# Patient Record
Sex: MALE | Race: WHITE | NOT HISPANIC OR LATINO | URBAN - METROPOLITAN AREA
[De-identification: names, ages, dates, MRNs, and addresses within clinical notes are randomized per-mention and may not be internally consistent; named-entity substitution may affect disease eponyms.]

---

## 2017-10-16 ENCOUNTER — ON CAMPUS - OUTPATIENT (AMBULATORY)
Dept: URBAN - METROPOLITAN AREA HOSPITAL 2 | Facility: HOSPITAL | Age: 67
End: 2017-10-16

## 2017-10-16 VITALS
SYSTOLIC BLOOD PRESSURE: 123 MMHG | HEART RATE: 58 BPM | DIASTOLIC BLOOD PRESSURE: 68 MMHG | DIASTOLIC BLOOD PRESSURE: 85 MMHG | SYSTOLIC BLOOD PRESSURE: 173 MMHG | DIASTOLIC BLOOD PRESSURE: 88 MMHG | HEIGHT: 72 IN | DIASTOLIC BLOOD PRESSURE: 91 MMHG | OXYGEN SATURATION: 96 % | HEART RATE: 71 BPM | SYSTOLIC BLOOD PRESSURE: 126 MMHG | HEART RATE: 60 BPM | OXYGEN SATURATION: 99 % | DIASTOLIC BLOOD PRESSURE: 72 MMHG | HEART RATE: 62 BPM | TEMPERATURE: 97.5 F | HEART RATE: 64 BPM | SYSTOLIC BLOOD PRESSURE: 113 MMHG | SYSTOLIC BLOOD PRESSURE: 182 MMHG | SYSTOLIC BLOOD PRESSURE: 125 MMHG | DIASTOLIC BLOOD PRESSURE: 62 MMHG | WEIGHT: 245 LBS | OXYGEN SATURATION: 98 % | DIASTOLIC BLOOD PRESSURE: 73 MMHG | SYSTOLIC BLOOD PRESSURE: 111 MMHG | OXYGEN SATURATION: 100 % | SYSTOLIC BLOOD PRESSURE: 165 MMHG | RESPIRATION RATE: 16 BRPM | HEART RATE: 65 BPM

## 2017-10-16 DIAGNOSIS — K57.30 DIVERTICULOSIS OF LARGE INTESTINE WITHOUT PERFORATION OR ABS: ICD-10-CM

## 2017-10-16 DIAGNOSIS — K64.1 SECOND DEGREE HEMORRHOIDS: ICD-10-CM

## 2017-10-16 DIAGNOSIS — K62.5 HEMORRHAGE OF ANUS AND RECTUM: ICD-10-CM

## 2017-10-16 PROCEDURE — 45378 DIAGNOSTIC COLONOSCOPY: CPT | Performed by: INTERNAL MEDICINE

## 2017-10-16 RX ORDER — HYDROCORTISONE 25 MG/G
CREAM TOPICAL
Qty: 30 | Refills: 6 | Status: COMPLETED
Start: 2017-10-16 | End: 2020-06-26

## 2017-10-16 RX ADMIN — PROPOFOL: 10 INJECTION, EMULSION INTRAVENOUS at 15:57

## 2018-11-06 ENCOUNTER — HOSPITAL ENCOUNTER (OUTPATIENT)
Dept: CARDIOLOGY | Facility: HOSPITAL | Age: 68
Discharge: HOME | End: 2018-11-06
Attending: FAMILY MEDICINE | Admitting: FAMILY MEDICINE

## 2019-04-09 ENCOUNTER — HOSPITAL ENCOUNTER (OUTPATIENT)
Dept: OTHER | Facility: HOSPITAL | Age: 69
Setting detail: SPECIMEN
Discharge: HOME | End: 2019-04-09
Attending: PODIATRIST | Admitting: PODIATRIST

## 2020-06-26 ENCOUNTER — OFFICE (AMBULATORY)
Dept: URBAN - METROPOLITAN AREA CLINIC 64 | Facility: CLINIC | Age: 70
End: 2020-06-26
Payer: COMMERCIAL

## 2020-06-26 VITALS
SYSTOLIC BLOOD PRESSURE: 140 MMHG | HEIGHT: 72 IN | DIASTOLIC BLOOD PRESSURE: 66 MMHG | HEART RATE: 66 BPM | WEIGHT: 252 LBS

## 2020-06-26 DIAGNOSIS — K64.8 OTHER HEMORRHOIDS: ICD-10-CM

## 2020-06-26 DIAGNOSIS — K62.5 HEMORRHAGE OF ANUS AND RECTUM: ICD-10-CM

## 2020-06-26 PROCEDURE — 99213 OFFICE O/P EST LOW 20 MIN: CPT | Performed by: NURSE PRACTITIONER

## 2020-07-10 ENCOUNTER — OFFICE (AMBULATORY)
Dept: URBAN - METROPOLITAN AREA CLINIC 64 | Facility: CLINIC | Age: 70
End: 2020-07-10
Payer: COMMERCIAL

## 2020-07-10 VITALS — HEIGHT: 72 IN

## 2020-07-10 DIAGNOSIS — K64.1 SECOND DEGREE HEMORRHOIDS: ICD-10-CM

## 2020-07-10 DIAGNOSIS — K62.5 HEMORRHAGE OF ANUS AND RECTUM: ICD-10-CM

## 2020-07-10 PROCEDURE — 46221 LIGATION OF HEMORRHOID(S): CPT | Performed by: INTERNAL MEDICINE

## 2020-07-24 ENCOUNTER — OFFICE (AMBULATORY)
Dept: URBAN - METROPOLITAN AREA CLINIC 64 | Facility: CLINIC | Age: 70
End: 2020-07-24
Payer: COMMERCIAL

## 2020-07-24 VITALS — HEIGHT: 72 IN

## 2020-07-24 DIAGNOSIS — K62.5 HEMORRHAGE OF ANUS AND RECTUM: ICD-10-CM

## 2020-07-24 DIAGNOSIS — K64.1 SECOND DEGREE HEMORRHOIDS: ICD-10-CM

## 2020-07-24 PROCEDURE — 46221 LIGATION OF HEMORRHOID(S): CPT | Performed by: INTERNAL MEDICINE

## 2020-08-07 ENCOUNTER — OFFICE (AMBULATORY)
Dept: URBAN - METROPOLITAN AREA CLINIC 64 | Facility: CLINIC | Age: 70
End: 2020-08-07
Payer: COMMERCIAL

## 2020-08-07 VITALS — HEIGHT: 72 IN

## 2020-08-07 DIAGNOSIS — K62.5 HEMORRHAGE OF ANUS AND RECTUM: ICD-10-CM

## 2020-08-07 DIAGNOSIS — K64.1 SECOND DEGREE HEMORRHOIDS: ICD-10-CM

## 2020-08-07 PROCEDURE — 46221 LIGATION OF HEMORRHOID(S): CPT | Performed by: INTERNAL MEDICINE

## 2022-11-01 ENCOUNTER — ON CAMPUS - OUTPATIENT (AMBULATORY)
Dept: URBAN - METROPOLITAN AREA HOSPITAL 2 | Facility: HOSPITAL | Age: 72
End: 2022-11-01

## 2022-11-01 ENCOUNTER — OFFICE (AMBULATORY)
Dept: URBAN - METROPOLITAN AREA PATHOLOGY 4 | Facility: PATHOLOGY | Age: 72
End: 2022-11-01
Payer: COMMERCIAL

## 2022-11-01 ENCOUNTER — OFFICE (AMBULATORY)
Dept: URBAN - METROPOLITAN AREA PATHOLOGY 4 | Facility: PATHOLOGY | Age: 72
End: 2022-11-01

## 2022-11-01 VITALS
DIASTOLIC BLOOD PRESSURE: 61 MMHG | RESPIRATION RATE: 15 BRPM | SYSTOLIC BLOOD PRESSURE: 100 MMHG | OXYGEN SATURATION: 96 % | DIASTOLIC BLOOD PRESSURE: 63 MMHG | HEART RATE: 56 BPM | HEIGHT: 72 IN | SYSTOLIC BLOOD PRESSURE: 102 MMHG | DIASTOLIC BLOOD PRESSURE: 71 MMHG | SYSTOLIC BLOOD PRESSURE: 95 MMHG | DIASTOLIC BLOOD PRESSURE: 55 MMHG | HEART RATE: 61 BPM | OXYGEN SATURATION: 97 % | HEART RATE: 60 BPM | SYSTOLIC BLOOD PRESSURE: 136 MMHG | SYSTOLIC BLOOD PRESSURE: 156 MMHG | HEART RATE: 63 BPM | SYSTOLIC BLOOD PRESSURE: 149 MMHG | HEART RATE: 58 BPM | DIASTOLIC BLOOD PRESSURE: 141 MMHG | HEART RATE: 57 BPM | DIASTOLIC BLOOD PRESSURE: 77 MMHG | TEMPERATURE: 98.7 F | DIASTOLIC BLOOD PRESSURE: 58 MMHG | DIASTOLIC BLOOD PRESSURE: 56 MMHG | DIASTOLIC BLOOD PRESSURE: 60 MMHG | SYSTOLIC BLOOD PRESSURE: 144 MMHG | OXYGEN SATURATION: 98 % | RESPIRATION RATE: 16 BRPM | SYSTOLIC BLOOD PRESSURE: 123 MMHG | WEIGHT: 242 LBS | OXYGEN SATURATION: 95 %

## 2022-11-01 DIAGNOSIS — K51.40 INFLAMMATORY POLYPS OF COLON WITHOUT COMPLICATIONS: ICD-10-CM

## 2022-11-01 DIAGNOSIS — K57.30 DIVERTICULOSIS OF LARGE INTESTINE WITHOUT PERFORATION OR ABS: ICD-10-CM

## 2022-11-01 DIAGNOSIS — Z86.010 PERSONAL HISTORY OF COLONIC POLYPS: ICD-10-CM

## 2022-11-01 DIAGNOSIS — K62.1 RECTAL POLYP: ICD-10-CM

## 2022-11-01 DIAGNOSIS — K63.5 POLYP OF COLON: ICD-10-CM

## 2022-11-01 LAB
GI HISTOLOGY: A. UNSPECIFIED: (no result)
GI HISTOLOGY: B. UNSPECIFIED: (no result)
GI HISTOLOGY: C. UNSPECIFIED: (no result)
GI HISTOLOGY: PDF REPORT: (no result)

## 2022-11-01 PROCEDURE — 45385 COLONOSCOPY W/LESION REMOVAL: CPT | Mod: PT | Performed by: INTERNAL MEDICINE

## 2022-11-01 PROCEDURE — 45380 COLONOSCOPY AND BIOPSY: CPT | Mod: PT,59 | Performed by: INTERNAL MEDICINE

## 2022-11-01 PROCEDURE — 88305 TISSUE EXAM BY PATHOLOGIST: CPT | Mod: 26 | Performed by: INTERNAL MEDICINE

## 2022-11-01 PROCEDURE — 45380 COLONOSCOPY AND BIOPSY: CPT | Mod: 59,PT | Performed by: INTERNAL MEDICINE

## 2022-11-07 ENCOUNTER — APPOINTMENT (OUTPATIENT)
Dept: CT IMAGING | Facility: HOSPITAL | Age: 72
End: 2022-11-07

## 2022-11-07 ENCOUNTER — HOSPITAL ENCOUNTER (OUTPATIENT)
Facility: HOSPITAL | Age: 72
Setting detail: OBSERVATION
Discharge: HOME | End: 2022-11-09
Attending: EMERGENCY MEDICINE | Admitting: INTERNAL MEDICINE

## 2022-11-07 ENCOUNTER — APPOINTMENT (OUTPATIENT)
Dept: GENERAL RADIOLOGY | Facility: HOSPITAL | Age: 72
End: 2022-11-07

## 2022-11-07 DIAGNOSIS — D72.829 LEUKOCYTOSIS, UNSPECIFIED TYPE: ICD-10-CM

## 2022-11-07 DIAGNOSIS — R94.39 ABNORMAL NUCLEAR STRESS TEST: ICD-10-CM

## 2022-11-07 DIAGNOSIS — R07.9 CHEST PAIN, UNSPECIFIED TYPE: Primary | ICD-10-CM

## 2022-11-07 DIAGNOSIS — A41.9 SEPSIS, DUE TO UNSPECIFIED ORGANISM, UNSPECIFIED WHETHER ACUTE ORGAN DYSFUNCTION PRESENT: ICD-10-CM

## 2022-11-07 LAB
ALBUMIN SERPL-MCNC: 4.8 G/DL (ref 3.5–5.2)
ALBUMIN/GLOB SERPL: 1.5 G/DL
ALP SERPL-CCNC: 90 U/L (ref 39–117)
ALT SERPL W P-5'-P-CCNC: 45 U/L (ref 1–41)
ANION GAP SERPL CALCULATED.3IONS-SCNC: 11 MMOL/L (ref 5–15)
APTT PPP: 25.4 SECONDS (ref 61–76.5)
AST SERPL-CCNC: 25 U/L (ref 1–40)
B PARAPERT DNA SPEC QL NAA+PROBE: NOT DETECTED
B PERT DNA SPEC QL NAA+PROBE: NOT DETECTED
BILIRUB SERPL-MCNC: 0.4 MG/DL (ref 0–1.2)
BILIRUB UR QL STRIP: NEGATIVE
BUN SERPL-MCNC: 18 MG/DL (ref 8–23)
BUN/CREAT SERPL: 18.6 (ref 7–25)
C PNEUM DNA NPH QL NAA+NON-PROBE: NOT DETECTED
CALCIUM SPEC-SCNC: 9.8 MG/DL (ref 8.6–10.5)
CHLORIDE SERPL-SCNC: 103 MMOL/L (ref 98–107)
CLARITY UR: CLEAR
CO2 SERPL-SCNC: 27 MMOL/L (ref 22–29)
COLOR UR: YELLOW
CREAT SERPL-MCNC: 0.97 MG/DL (ref 0.76–1.27)
D DIMER PPP FEU-MCNC: 3.33 MG/L (FEU) (ref 0–0.59)
D-LACTATE SERPL-SCNC: 1.6 MMOL/L (ref 0.5–2)
DEPRECATED RDW RBC AUTO: 47.7 FL (ref 37–54)
EGFRCR SERPLBLD CKD-EPI 2021: 84.5 ML/MIN/1.73
ERYTHROCYTE [DISTWIDTH] IN BLOOD BY AUTOMATED COUNT: 14.7 % (ref 12.3–15.4)
FLUAV SUBTYP SPEC NAA+PROBE: NOT DETECTED
FLUBV RNA ISLT QL NAA+PROBE: NOT DETECTED
GLOBULIN UR ELPH-MCNC: 3.1 GM/DL
GLUCOSE SERPL-MCNC: 125 MG/DL (ref 65–99)
GLUCOSE UR STRIP-MCNC: NEGATIVE MG/DL
HADV DNA SPEC NAA+PROBE: NOT DETECTED
HCOV 229E RNA SPEC QL NAA+PROBE: NOT DETECTED
HCOV HKU1 RNA SPEC QL NAA+PROBE: NOT DETECTED
HCOV NL63 RNA SPEC QL NAA+PROBE: NOT DETECTED
HCOV OC43 RNA SPEC QL NAA+PROBE: NOT DETECTED
HCT VFR BLD AUTO: 45.2 % (ref 37.5–51)
HGB BLD-MCNC: 14.8 G/DL (ref 13–17.7)
HGB UR QL STRIP.AUTO: NEGATIVE
HMPV RNA NPH QL NAA+NON-PROBE: NOT DETECTED
HOLD SPECIMEN: NORMAL
HPIV1 RNA ISLT QL NAA+PROBE: NOT DETECTED
HPIV2 RNA SPEC QL NAA+PROBE: NOT DETECTED
HPIV3 RNA NPH QL NAA+PROBE: NOT DETECTED
HPIV4 P GENE NPH QL NAA+PROBE: NOT DETECTED
INR PPP: 0.94 (ref 0.93–1.1)
KETONES UR QL STRIP: NEGATIVE
LEUKOCYTE ESTERASE UR QL STRIP.AUTO: NEGATIVE
LIPASE SERPL-CCNC: 38 U/L (ref 13–60)
LYMPHOCYTES # BLD MANUAL: 0.89 10*3/MM3 (ref 0.7–3.1)
LYMPHOCYTES NFR BLD MANUAL: 4 % (ref 5–12)
M PNEUMO IGG SER IA-ACNC: NOT DETECTED
MCH RBC QN AUTO: 30.6 PG (ref 26.6–33)
MCHC RBC AUTO-ENTMCNC: 32.7 G/DL (ref 31.5–35.7)
MCV RBC AUTO: 93.4 FL (ref 79–97)
METAMYELOCYTES NFR BLD MANUAL: 7 % (ref 0–0)
MONOCYTES # BLD: 0.71 10*3/MM3 (ref 0.1–0.9)
NEUTROPHILS # BLD AUTO: 14.87 10*3/MM3 (ref 1.7–7)
NEUTROPHILS NFR BLD MANUAL: 71 % (ref 42.7–76)
NEUTS BAND NFR BLD MANUAL: 13 % (ref 0–5)
NEUTS VAC BLD QL SMEAR: ABNORMAL
NITRITE UR QL STRIP: NEGATIVE
PH UR STRIP.AUTO: <=5 [PH] (ref 5–8)
PLAT MORPH BLD: NORMAL
PLATELET # BLD AUTO: 265 10*3/MM3 (ref 140–450)
PMV BLD AUTO: 7.9 FL (ref 6–12)
POTASSIUM SERPL-SCNC: 4.5 MMOL/L (ref 3.5–5.2)
PROT SERPL-MCNC: 7.9 G/DL (ref 6–8.5)
PROT UR QL STRIP: NEGATIVE
PROTHROMBIN TIME: 9.7 SECONDS (ref 9.6–11.7)
RBC # BLD AUTO: 4.84 10*6/MM3 (ref 4.14–5.8)
RBC MORPH BLD: NORMAL
RHINOVIRUS RNA SPEC NAA+PROBE: NOT DETECTED
RSV RNA NPH QL NAA+NON-PROBE: NOT DETECTED
SARS-COV-2 RNA NPH QL NAA+NON-PROBE: NOT DETECTED
SCAN SLIDE: NORMAL
SODIUM SERPL-SCNC: 141 MMOL/L (ref 136–145)
SP GR UR STRIP: 1.03 (ref 1–1.03)
TROPONIN T SERPL-MCNC: <0.01 NG/ML (ref 0–0.03)
UROBILINOGEN UR QL STRIP: NORMAL
VARIANT LYMPHS NFR BLD MANUAL: 5 % (ref 19.6–45.3)
WBC NRBC COR # BLD: 17.7 10*3/MM3 (ref 3.4–10.8)

## 2022-11-07 PROCEDURE — 85730 THROMBOPLASTIN TIME PARTIAL: CPT | Performed by: EMERGENCY MEDICINE

## 2022-11-07 PROCEDURE — 87040 BLOOD CULTURE FOR BACTERIA: CPT | Performed by: EMERGENCY MEDICINE

## 2022-11-07 PROCEDURE — G0378 HOSPITAL OBSERVATION PER HR: HCPCS

## 2022-11-07 PROCEDURE — 96372 THER/PROPH/DIAG INJ SC/IM: CPT

## 2022-11-07 PROCEDURE — 85610 PROTHROMBIN TIME: CPT | Performed by: EMERGENCY MEDICINE

## 2022-11-07 PROCEDURE — 81003 URINALYSIS AUTO W/O SCOPE: CPT | Performed by: EMERGENCY MEDICINE

## 2022-11-07 PROCEDURE — 83605 ASSAY OF LACTIC ACID: CPT

## 2022-11-07 PROCEDURE — 25010000002 ENOXAPARIN PER 10 MG: Performed by: HOSPITALIST

## 2022-11-07 PROCEDURE — 99285 EMERGENCY DEPT VISIT HI MDM: CPT

## 2022-11-07 PROCEDURE — 25010000002 CEFEPIME PER 500 MG: Performed by: EMERGENCY MEDICINE

## 2022-11-07 PROCEDURE — 0 IOPAMIDOL PER 1 ML: Performed by: EMERGENCY MEDICINE

## 2022-11-07 PROCEDURE — 85379 FIBRIN DEGRADATION QUANT: CPT | Performed by: EMERGENCY MEDICINE

## 2022-11-07 PROCEDURE — 0202U NFCT DS 22 TRGT SARS-COV-2: CPT | Performed by: EMERGENCY MEDICINE

## 2022-11-07 PROCEDURE — 85007 BL SMEAR W/DIFF WBC COUNT: CPT | Performed by: EMERGENCY MEDICINE

## 2022-11-07 PROCEDURE — 85025 COMPLETE CBC W/AUTO DIFF WBC: CPT | Performed by: EMERGENCY MEDICINE

## 2022-11-07 PROCEDURE — 96365 THER/PROPH/DIAG IV INF INIT: CPT

## 2022-11-07 PROCEDURE — 71045 X-RAY EXAM CHEST 1 VIEW: CPT

## 2022-11-07 PROCEDURE — 84484 ASSAY OF TROPONIN QUANT: CPT | Performed by: EMERGENCY MEDICINE

## 2022-11-07 PROCEDURE — 83690 ASSAY OF LIPASE: CPT | Performed by: EMERGENCY MEDICINE

## 2022-11-07 PROCEDURE — 93005 ELECTROCARDIOGRAM TRACING: CPT | Performed by: EMERGENCY MEDICINE

## 2022-11-07 PROCEDURE — 71275 CT ANGIOGRAPHY CHEST: CPT

## 2022-11-07 PROCEDURE — 80053 COMPREHEN METABOLIC PANEL: CPT | Performed by: EMERGENCY MEDICINE

## 2022-11-07 RX ORDER — ONDANSETRON 2 MG/ML
4 INJECTION INTRAMUSCULAR; INTRAVENOUS EVERY 6 HOURS PRN
Status: DISCONTINUED | OUTPATIENT
Start: 2022-11-07 | End: 2022-11-09 | Stop reason: HOSPADM

## 2022-11-07 RX ORDER — ALLOPURINOL 300 MG/1
300 TABLET ORAL DAILY
Status: DISCONTINUED | OUTPATIENT
Start: 2022-11-08 | End: 2022-11-09 | Stop reason: HOSPADM

## 2022-11-07 RX ORDER — SODIUM CHLORIDE 0.9 % (FLUSH) 0.9 %
10 SYRINGE (ML) INJECTION AS NEEDED
Status: DISCONTINUED | OUTPATIENT
Start: 2022-11-07 | End: 2022-11-09 | Stop reason: HOSPADM

## 2022-11-07 RX ORDER — ASPIRIN 81 MG/1
81 TABLET ORAL DAILY
Status: DISCONTINUED | OUTPATIENT
Start: 2022-11-08 | End: 2022-11-09 | Stop reason: HOSPADM

## 2022-11-07 RX ORDER — CEFDINIR 300 MG/1
300 CAPSULE ORAL EVERY 12 HOURS SCHEDULED
Status: DISCONTINUED | OUTPATIENT
Start: 2022-11-07 | End: 2022-11-09 | Stop reason: HOSPADM

## 2022-11-07 RX ORDER — HYDROCODONE BITARTRATE AND ACETAMINOPHEN 5; 325 MG/1; MG/1
1 TABLET ORAL EVERY 4 HOURS PRN
Status: DISCONTINUED | OUTPATIENT
Start: 2022-11-07 | End: 2022-11-09 | Stop reason: HOSPADM

## 2022-11-07 RX ORDER — LOSARTAN POTASSIUM 50 MG/1
100 TABLET ORAL
Status: DISCONTINUED | OUTPATIENT
Start: 2022-11-08 | End: 2022-11-09 | Stop reason: HOSPADM

## 2022-11-07 RX ORDER — METRONIDAZOLE 500 MG/1
500 TABLET ORAL EVERY 8 HOURS SCHEDULED
Status: DISCONTINUED | OUTPATIENT
Start: 2022-11-07 | End: 2022-11-09 | Stop reason: HOSPADM

## 2022-11-07 RX ORDER — AMOXICILLIN 250 MG
2 CAPSULE ORAL 2 TIMES DAILY PRN
Status: DISCONTINUED | OUTPATIENT
Start: 2022-11-07 | End: 2022-11-09 | Stop reason: HOSPADM

## 2022-11-07 RX ORDER — NEBIVOLOL 10 MG/1
1 TABLET ORAL DAILY
COMMUNITY
Start: 2022-05-27

## 2022-11-07 RX ORDER — ACETAMINOPHEN 325 MG/1
650 TABLET ORAL EVERY 4 HOURS PRN
Status: DISCONTINUED | OUTPATIENT
Start: 2022-11-07 | End: 2022-11-09 | Stop reason: HOSPADM

## 2022-11-07 RX ORDER — ALLOPURINOL 300 MG/1
1 TABLET ORAL DAILY
COMMUNITY
Start: 2022-09-26

## 2022-11-07 RX ORDER — CHOLECALCIFEROL (VITAMIN D3) 125 MCG
5 CAPSULE ORAL NIGHTLY PRN
Status: DISCONTINUED | OUTPATIENT
Start: 2022-11-07 | End: 2022-11-09 | Stop reason: HOSPADM

## 2022-11-07 RX ORDER — CALCIUM CARBONATE 300MG(750)
400 TABLET,CHEWABLE ORAL DAILY
COMMUNITY
End: 2022-11-09 | Stop reason: HOSPADM

## 2022-11-07 RX ORDER — COLCHICINE 0.6 MG/1
0.6 CAPSULE ORAL EVERY OTHER DAY
COMMUNITY
End: 2022-11-09 | Stop reason: HOSPADM

## 2022-11-07 RX ORDER — ONDANSETRON 4 MG/1
4 TABLET, FILM COATED ORAL EVERY 6 HOURS PRN
Status: DISCONTINUED | OUTPATIENT
Start: 2022-11-07 | End: 2022-11-09 | Stop reason: HOSPADM

## 2022-11-07 RX ORDER — ATORVASTATIN CALCIUM 10 MG/1
10 TABLET, FILM COATED ORAL DAILY
Status: DISCONTINUED | OUTPATIENT
Start: 2022-11-08 | End: 2022-11-09 | Stop reason: HOSPADM

## 2022-11-07 RX ORDER — CHOLECALCIFEROL (VITAMIN D3) 125 MCG
500 CAPSULE ORAL DAILY
COMMUNITY
End: 2022-11-09 | Stop reason: HOSPADM

## 2022-11-07 RX ORDER — BISACODYL 5 MG/1
5 TABLET, DELAYED RELEASE ORAL DAILY PRN
Status: DISCONTINUED | OUTPATIENT
Start: 2022-11-07 | End: 2022-11-09 | Stop reason: HOSPADM

## 2022-11-07 RX ORDER — ENOXAPARIN SODIUM 100 MG/ML
40 INJECTION SUBCUTANEOUS DAILY
Status: DISCONTINUED | OUTPATIENT
Start: 2022-11-07 | End: 2022-11-09 | Stop reason: HOSPADM

## 2022-11-07 RX ORDER — SIMVASTATIN 20 MG
20 TABLET ORAL NIGHTLY
COMMUNITY
Start: 2022-09-06

## 2022-11-07 RX ORDER — BISACODYL 10 MG
10 SUPPOSITORY, RECTAL RECTAL DAILY PRN
Status: DISCONTINUED | OUTPATIENT
Start: 2022-11-07 | End: 2022-11-09 | Stop reason: HOSPADM

## 2022-11-07 RX ORDER — VITAMIN B COMPLEX
1 CAPSULE ORAL DAILY
COMMUNITY
End: 2022-11-09 | Stop reason: HOSPADM

## 2022-11-07 RX ORDER — SODIUM CHLORIDE 0.9 % (FLUSH) 0.9 %
10 SYRINGE (ML) INJECTION EVERY 12 HOURS SCHEDULED
Status: DISCONTINUED | OUTPATIENT
Start: 2022-11-07 | End: 2022-11-09 | Stop reason: HOSPADM

## 2022-11-07 RX ORDER — NEBIVOLOL 10 MG/1
10 TABLET ORAL DAILY
Status: DISCONTINUED | OUTPATIENT
Start: 2022-11-08 | End: 2022-11-09 | Stop reason: HOSPADM

## 2022-11-07 RX ORDER — POLYETHYLENE GLYCOL 3350 17 G/17G
17 POWDER, FOR SOLUTION ORAL DAILY PRN
Status: DISCONTINUED | OUTPATIENT
Start: 2022-11-07 | End: 2022-11-09 | Stop reason: HOSPADM

## 2022-11-07 RX ORDER — LEVOTHYROXINE SODIUM 0.15 MG/1
1 TABLET ORAL
COMMUNITY
Start: 2022-10-10

## 2022-11-07 RX ORDER — FUROSEMIDE 20 MG/1
20 TABLET ORAL DAILY PRN
COMMUNITY
End: 2022-11-09 | Stop reason: HOSPADM

## 2022-11-07 RX ORDER — IRBESARTAN 300 MG/1
1 TABLET ORAL DAILY
COMMUNITY
Start: 2022-06-30

## 2022-11-07 RX ORDER — AMOXICILLIN 500 MG
1 CAPSULE ORAL DAILY
COMMUNITY
End: 2022-11-09 | Stop reason: HOSPADM

## 2022-11-07 RX ORDER — LEVOTHYROXINE SODIUM 0.15 MG/1
150 TABLET ORAL
Status: DISCONTINUED | OUTPATIENT
Start: 2022-11-08 | End: 2022-11-09 | Stop reason: HOSPADM

## 2022-11-07 RX ORDER — AMPICILLIN TRIHYDRATE 250 MG
1000 CAPSULE ORAL DAILY
COMMUNITY
End: 2022-11-09 | Stop reason: HOSPADM

## 2022-11-07 RX ORDER — PANTOPRAZOLE SODIUM 40 MG/1
40 TABLET, DELAYED RELEASE ORAL
Status: DISCONTINUED | OUTPATIENT
Start: 2022-11-08 | End: 2022-11-09 | Stop reason: HOSPADM

## 2022-11-07 RX ADMIN — CEFDINIR 300 MG: 300 CAPSULE ORAL at 20:27

## 2022-11-07 RX ADMIN — IOPAMIDOL 100 ML: 755 INJECTION, SOLUTION INTRAVENOUS at 14:44

## 2022-11-07 RX ADMIN — METRONIDAZOLE 500 MG: 500 TABLET ORAL at 21:46

## 2022-11-07 RX ADMIN — CEFEPIME 2 G: 2 INJECTION, POWDER, FOR SOLUTION INTRAVENOUS at 15:38

## 2022-11-07 RX ADMIN — ENOXAPARIN SODIUM 40 MG: 100 INJECTION SUBCUTANEOUS at 20:27

## 2022-11-07 NOTE — H&P
[facility] Medicine Services  History & Physical    Patient Name: Gray Huerta  : 1950  MRN: [id number]  Primary Care Physician:  Venecia Kohler MD  Date of admission: 2022  Date and Time of Service:  at     Subjective      Chief Complaint: Chest pain. Abdominal pain     History of Present Illness: Gray Huerta is a 71 y.o. male who presented to [facility] on 2022 complaining of Patient is a 71-year-old gentleman with history of gout, hypothyroidism, dyslipidemia, hypertension, PREMA on BiPAP remote history of smoking who presented to the emergency room with chief complaint of abdominal pain and chest pain.  Patient said on Tuesday he developed some upper abdominal pain when he woke up and went to drink coffee and had to go to the bathroom and had a watery bowel movement.  That had improved but he developed some left-sided chest pressure without any radiation.  Happened at rest last would last about 45 minutes and go away on its own.  No associated shortness of breath lightheadedness or diaphoresis.  Patient usually not able to ambulate much due to bilateral knee problems.  Due to the ongoing abdominal pain and chest pain he came in for further evaluation.  Of note he said he had a colonoscopy few days ago and had 4 polyps removed and reports he had mild diverticulitis and was not given any medications as it was what appears to be mild.    Patient denies any fevers, chills, sweats, sore throat.  He does have a runny nose which is chronic for him.  Denies any dysuria or hematuria.  No cough or shortness of breath.    In the emergency room patient's blood pressure is 130/92 with a heart rate of 71 and a temperature of 98.7.  Labs showed a leukocytosis of 17,000 with 13% bands.  D-dimer was elevated and CTA was done which was negative for PE but showed noncalcified nodules which are is chronic and being followed up as an outpatient.  Troponin was negative.  Of note  patient has calcified atherosclerosis of the coronary arteries on previous CTs.  Patient was given IV cefepime in the emergency room blood cultures were drawn will be admitted for observation.  Currently he has no chest pain      Review of Systems   Constitutional: Negative.   HENT: Negative.    Eyes: Negative.    Cardiovascular: Positive for chest pain. Negative for cyanosis, dyspnea on exertion, irregular heartbeat, leg swelling, near-syncope, orthopnea, palpitations, paroxysmal nocturnal dyspnea and syncope.   Respiratory: Negative.    Endocrine: Negative.    Hematologic/Lymphatic: Negative.    Skin: Negative.    Musculoskeletal: Negative.    Gastrointestinal: Positive for abdominal pain and diarrhea. Negative for dysphagia, excessive appetite, heartburn, hematemesis, hematochezia, hemorrhoids, jaundice, melena, nausea and vomiting.   Genitourinary: Negative.    Neurological: Negative.    Psychiatric/Behavioral: Negative.         Personal History     No past medical history on file.    No past surgical history on file.    Family History: family history is not on file. Otherwise pertinent FHx was reviewed and not pertinent to current issue.    Social History:      Home Medications:  Prior to Admission Medications     Prescriptions Last Dose Informant Patient Reported? Taking?    allopurinol (ZYLOPRIM) 300 MG tablet 11/6/2022  Yes Yes    Take 1 tablet by mouth Daily.    ascorbic acid (VITAMIN C) 1000 MG tablet 11/6/2022  Yes Yes    Take 1 tablet by mouth Daily.    B Complex Vitamins (vitamin b complex) capsule capsule 11/6/2022  Yes Yes    Take 1 capsule by mouth Daily.    Cinnamon 500 MG capsule 11/6/2022  Yes Yes    Take 2 capsules by mouth Daily.    colchicine 0.6 MG capsule capsule Past Week  Yes Yes    Take 1 capsule by mouth Every Other Day.    furosemide (LASIX) 20 MG tablet Past Week  Yes Yes    Take 1 tablet by mouth Daily As Needed (right leg swelling).    irbesartan (AVAPRO) 300 MG tablet 11/6/2022  Yes  Yes    Take 1 tablet by mouth Daily.    levothyroxine (SYNTHROID, LEVOTHROID) 150 MCG tablet 11/7/2022  Yes Yes    Take 1 tablet by mouth Every Morning.    Magnesium 400 MG tablet 11/6/2022  Yes Yes    Take 400 mg by mouth Daily.    nebivolol (BYSTOLIC) 10 MG tablet   Yes Yes    Take 1 tablet by mouth Daily.    Omega-3 Fatty Acids (fish oil) 1200 MG capsule capsule 11/6/2022  Yes Yes    Take 1 capsule by mouth Daily.    Probiotic Product (PROBIOTIC PO) 11/6/2022  Yes Yes    Take 1 capsule by mouth Daily.    QUERCETIN PO 11/6/2022  Yes Yes    Take 1 capsule by mouth Daily.    simvastatin (ZOCOR) 20 MG tablet 11/6/2022  Yes Yes    Take 1 tablet by mouth Every Night.    Turmeric Curcumin 500 MG capsule 11/6/2022  Yes Yes    Take 1 capsule by mouth Daily.    vitamin B-12 (CYANOCOBALAMIN) 500 MCG tablet 11/6/2022  Yes Yes    Take 1 tablet by mouth Daily.    vitamin D3 125 MCG (5000 UT) capsule capsule 11/6/2022  Yes Yes    Take 1 capsule by mouth Daily.    Zinc 50 MG capsule 11/6/2022  Yes Yes    Take 50 mg by mouth Daily.            Allergies:  No Known Allergies    Objective      Vitals:   Temp:  [98.7 °F (37.1 °C)] 98.7 °F (37.1 °C)  Heart Rate:  [] 104  Resp:  [18] 18  BP: (130-168)/(61-92) 149/61    Physical Exam   General: No acute distress  HEENT: neck supple, normal oral mucosa, no masses, no lymphadenopathy  Lungs: Clear bilaterally, no wheezing, No crackles, No Rhonchi. Equal excursions.   CV - Normal S1/S2, + systolic murmur LUSB,  Regular rate and rhythm   Abdomin - Soft, mild tenderness RLQ to deep palpation, non-distended, normal bowel sounds  Extremities - no edema, no erythema  Neuro - No focal weakness, normal sensation  Psych - Alert and oriented x3  Skin - no wounds or lesions.       Result Review    Result Review:  I have personally reviewed the results from the time of this admission to 11/7/2022 18:42 EST and agree with these findings:  [x]  Laboratory  [x]  Microbiology  [x]  Radiology  [x]   EKG/Telemetry   [x]  Cardiology/Vascular   []  Pathology  [x]  Old records  []  Other:  Most notable findings include:       Assessment & Plan        Active Hospital Problems:  There are no active hospital problems to display for this patient.    Plan:     Atypical chest pressure  - previous CT chest with calcified coronaries  - EKG with q waves II, III, avF. No acute ischemic changes  - Murmur on exam  - Check Echo. Consult Cardiology. Might benefit from Stress test - defer to cardiology  - NPO after midnight.     Abdominal pain/diarrhea  - Colonoscopy recently. Per patient was told has mild Diverticulitis  - Bandemia on labs  - Will give short course of Cefdinir and Flagyl for Diverticulitis   - Follow blood cultures    Gout - Allopurinol  Hypothyroidism - Levothyroxine  DL - Statin  HTN - Nebivolol, Irbesartan,   Remote history of smoking    DVT prophylaxis:  No DVT prophylaxis order currently exists.    CODE STATUS:       Admission Status:  I believe this patient meets Obs status.    I discussed the patient's findings and my recommendations with patient.    This patient has been  and discussed with . 11/07/22      Signature: Electronically signed by Muriel Bettencourt MD, 11/07/22, 18:42 EST.  [facility] Hospitalist Team

## 2022-11-07 NOTE — ED PROVIDER NOTES
"Subjective   History of Present Illness  Chief complaint: Chest pain    71-year-old male presents with chest pain.  Patient states the pain started this morning.  Pain is in the left chest without radiation.  He denies any shortness of breath.  He does report some nausea and mild headache.  He states he had a couple loose bowel movements this morning.  He denies any fever.  No known sick contacts.  He denies any alleviating or exacerbating factors.    History provided by:  Patient      Review of Systems   Constitutional: Negative for fever.   HENT: Negative for congestion.    Eyes: Negative for redness.   Respiratory: Negative for cough and shortness of breath.    Cardiovascular: Positive for chest pain.   Gastrointestinal: Positive for diarrhea and nausea. Negative for abdominal pain and vomiting.   Genitourinary: Negative for dysuria.   Musculoskeletal: Negative for back pain.   Skin: Negative for rash.   Neurological: Positive for headaches. Negative for dizziness.   Psychiatric/Behavioral: Negative for confusion.       No past medical history on file.    No Known Allergies    No past surgical history on file.    No family history on file.    Social History     Socioeconomic History   • Marital status:        /61   Pulse 104   Temp 98.7 °F (37.1 °C) (Oral)   Resp 18   Ht 188 cm (74\")   Wt 111 kg (245 lb)   SpO2 98%   BMI 31.46 kg/m²       Objective   Physical Exam  Vitals and nursing note reviewed.   Constitutional:       Appearance: He is well-developed.   HENT:      Head: Normocephalic and atraumatic.   Eyes:      Pupils: Pupils are equal, round, and reactive to light.   Cardiovascular:      Rate and Rhythm: Normal rate and regular rhythm.      Heart sounds: Normal heart sounds.   Pulmonary:      Effort: Pulmonary effort is normal. No respiratory distress.      Breath sounds: Normal breath sounds.   Abdominal:      General: Abdomen is flat. Bowel sounds are normal.      Palpations: Abdomen " is soft.      Tenderness: There is no abdominal tenderness.   Skin:     General: Skin is warm and dry.   Neurological:      General: No focal deficit present.      Mental Status: He is alert and oriented to person, place, and time.         Procedures           ED Course      My interpretation of EKG shows sinus rhythm, rate of 72, no ST elevation     Results for orders placed or performed during the hospital encounter of 11/07/22   Comprehensive Metabolic Panel    Specimen: Blood   Result Value Ref Range    Glucose 125 (H) 65 - 99 mg/dL    BUN 18 8 - 23 mg/dL    Creatinine 0.97 0.76 - 1.27 mg/dL    Sodium 141 136 - 145 mmol/L    Potassium 4.5 3.5 - 5.2 mmol/L    Chloride 103 98 - 107 mmol/L    CO2 27.0 22.0 - 29.0 mmol/L    Calcium 9.8 8.6 - 10.5 mg/dL    Total Protein 7.9 6.0 - 8.5 g/dL    Albumin 4.80 3.50 - 5.20 g/dL    ALT (SGPT) 45 (H) 1 - 41 U/L    AST (SGOT) 25 1 - 40 U/L    Alkaline Phosphatase 90 39 - 117 U/L    Total Bilirubin 0.4 0.0 - 1.2 mg/dL    Globulin 3.1 gm/dL    A/G Ratio 1.5 g/dL    BUN/Creatinine Ratio 18.6 7.0 - 25.0    Anion Gap 11.0 5.0 - 15.0 mmol/L    eGFR 84.5 >60.0 mL/min/1.73   Protime-INR    Specimen: Blood   Result Value Ref Range    Protime 9.7 9.6 - 11.7 Seconds    INR 0.94 0.93 - 1.10   aPTT    Specimen: Blood   Result Value Ref Range    PTT 25.4 (L) 61.0 - 76.5 seconds   Lipase    Specimen: Blood   Result Value Ref Range    Lipase 38 13 - 60 U/L   Troponin    Specimen: Blood   Result Value Ref Range    Troponin T <0.010 0.000 - 0.030 ng/mL   CBC Auto Differential    Specimen: Blood   Result Value Ref Range    WBC 17.70 (H) 3.40 - 10.80 10*3/mm3    RBC 4.84 4.14 - 5.80 10*6/mm3    Hemoglobin 14.8 13.0 - 17.7 g/dL    Hematocrit 45.2 37.5 - 51.0 %    MCV 93.4 79.0 - 97.0 fL    MCH 30.6 26.6 - 33.0 pg    MCHC 32.7 31.5 - 35.7 g/dL    RDW 14.7 12.3 - 15.4 %    RDW-SD 47.7 37.0 - 54.0 fl    MPV 7.9 6.0 - 12.0 fL    Platelets 265 140 - 450 10*3/mm3   Scan Slide    Specimen: Blood   Result  Value Ref Range    Scan Slide     Manual Differential    Specimen: Blood   Result Value Ref Range    Neutrophil % 71.0 42.7 - 76.0 %    Lymphocyte % 5.0 (L) 19.6 - 45.3 %    Monocyte % 4.0 (L) 5.0 - 12.0 %    Bands %  13.0 (H) 0.0 - 5.0 %    Metamyelocyte % 7.0 (H) 0.0 - 0.0 %    Neutrophils Absolute 14.87 (H) 1.70 - 7.00 10*3/mm3    Lymphocytes Absolute 0.89 0.70 - 3.10 10*3/mm3    Monocytes Absolute 0.71 0.10 - 0.90 10*3/mm3    RBC Morphology Normal Normal    Vacuolated Neutrophils Slight/1+ None Seen    Platelet Morphology Normal Normal   D-dimer, Quantitative    Specimen: Blood   Result Value Ref Range    D-Dimer, Quantitative 3.33 (H) 0.00 - 0.59 mg/L (FEU)   Urinalysis With Culture If Indicated - Urine, Clean Catch    Specimen: Urine, Clean Catch   Result Value Ref Range    Color, UA Yellow Yellow, Straw    Appearance, UA Clear Clear    pH, UA <=5.0 5.0 - 8.0    Specific Gravity, UA 1.028 1.005 - 1.030    Glucose, UA Negative Negative    Ketones, UA Negative Negative    Bilirubin, UA Negative Negative    Blood, UA Negative Negative    Protein, UA Negative Negative    Leuk Esterase, UA Negative Negative    Nitrite, UA Negative Negative    Urobilinogen, UA 0.2 E.U./dL 0.2 - 1.0 E.U./dL   POC Lactate    Specimen: Blood   Result Value Ref Range    Lactate 1.6 0.5 - 2.0 mmol/L   ECG 12 Lead Chest Pain   Result Value Ref Range    QT Interval 399 ms   Gold Top - SST   Result Value Ref Range    Extra Tube hide      XR Chest 1 View    Result Date: 11/7/2022  Questionable 1.5 cm noncalcified nodular density in the periphery of the right midlung, new since 2015. Dedicated CT chest is recommended for further evaluation at this time.  Electronically Signed By-Nory Galvan MD On:11/7/2022 11:27 AM This report was finalized on [id number] by  Nory Galvan MD.    CT Angiogram Chest Pulmonary Embolism    Result Date: 11/7/2022   1. No pulmonary embolism. 2. The questioned right lung nodule on previous chest x-ray  corresponds to a benign centrally calcified nodule, consistent with old granulomatous disease. 3. There are additional smaller noncalcified nodules in both lungs measuring 6 mm or less in size. Following the Fleischner Society criteria for pulmonary nodule management, CT chest follow-up in 3-6 months is recommended. 4. Hepatic steatosis.    Electronically Signed By-Nory Galvan MD On:11/7/2022 2:54 PM This report was finalized on [id number] by  Nory Galvan MD.                                    MDM   Patient had the above evaluation.  Results were discussed with the patient.  EKG shows no acute ischemia.  Troponin is negative.  D-dimer was elevated so CT PE protocol was obtained.  This showed no PE.  White blood cell count is significantly elevated at 17.7 with 13% bands.  Sepsis protocol was initiated.  Lactic acid was normal.  Blood cultures were obtained.  Patient was started on cefepime.  CT of the chest did not show any evidence of pneumonia.  Respiratory panel and urinalysis are pending at this time.  Patient will be admitted for further evaluation and management.  I discussed with the hospitalist who agreed to admit.      Final diagnoses:   Chest pain, unspecified type   Sepsis, due to unspecified organism, unspecified whether acute organ dysfunction present (HCC)   Leukocytosis, unspecified type       ED Disposition  ED Disposition     ED Disposition   Decision to Admit    Condition   --    Comment   Level of Care: Telemetry [5]   Admitting Physician: JOSE ADAME [[id number]]               No follow-up provider specified.       Medication List      No changes were made to your prescriptions during this visit.          Wilman Hu MD  11/07/22 [id number]

## 2022-11-08 ENCOUNTER — APPOINTMENT (OUTPATIENT)
Dept: NUCLEAR MEDICINE | Facility: HOSPITAL | Age: 72
End: 2022-11-08

## 2022-11-08 ENCOUNTER — APPOINTMENT (OUTPATIENT)
Dept: CARDIOLOGY | Facility: HOSPITAL | Age: 72
End: 2022-11-08

## 2022-11-08 PROBLEM — R94.39 ABNORMAL NUCLEAR STRESS TEST: Status: ACTIVE | Noted: 2022-11-07

## 2022-11-08 LAB
ANION GAP SERPL CALCULATED.3IONS-SCNC: 10 MMOL/L (ref 5–15)
BASOPHILS # BLD AUTO: 0 10*3/MM3 (ref 0–0.2)
BASOPHILS NFR BLD AUTO: 0.1 % (ref 0–1.5)
BH CV ECHO MEAS - ACS: 1.72 CM
BH CV ECHO MEAS - AO MAX PG: 16.4 MMHG
BH CV ECHO MEAS - AO MEAN PG: 8.5 MMHG
BH CV ECHO MEAS - AO ROOT DIAM: 3.2 CM
BH CV ECHO MEAS - AO V2 MAX: 202.3 CM/SEC
BH CV ECHO MEAS - AO V2 VTI: 40.4 CM
BH CV ECHO MEAS - AVA(I,D): 1.47 CM2
BH CV ECHO MEAS - EDV(CUBED): 274.1 ML
BH CV ECHO MEAS - EDV(MOD-SP4): 105.1 ML
BH CV ECHO MEAS - EF(MOD-BP): 66 %
BH CV ECHO MEAS - EF(MOD-SP4): 65.8 %
BH CV ECHO MEAS - ESV(CUBED): 64 ML
BH CV ECHO MEAS - ESV(MOD-SP4): 35.9 ML
BH CV ECHO MEAS - FS: 38.4 %
BH CV ECHO MEAS - IVS/LVPW: 0.68 CM
BH CV ECHO MEAS - IVSD: 0.73 CM
BH CV ECHO MEAS - LA DIMENSION: 4.7 CM
BH CV ECHO MEAS - LV DIASTOLIC VOL/BSA (35-75): 44.4 CM2
BH CV ECHO MEAS - LV MASS(C)D: 248.1 GRAMS
BH CV ECHO MEAS - LV MAX PG: 5.2 MMHG
BH CV ECHO MEAS - LV MEAN PG: 2.27 MMHG
BH CV ECHO MEAS - LV SYSTOLIC VOL/BSA (12-30): 15.2 CM2
BH CV ECHO MEAS - LV V1 MAX: 113.9 CM/SEC
BH CV ECHO MEAS - LV V1 VTI: 23.7 CM
BH CV ECHO MEAS - LVIDD: 6.5 CM
BH CV ECHO MEAS - LVIDS: 4 CM
BH CV ECHO MEAS - LVOT AREA: 2.5 CM2
BH CV ECHO MEAS - LVOT DIAM: 1.78 CM
BH CV ECHO MEAS - LVPWD: 1.07 CM
BH CV ECHO MEAS - MR MAX PG: 85.3 MMHG
BH CV ECHO MEAS - MR MAX VEL: 461.7 CM/SEC
BH CV ECHO MEAS - MV A MAX VEL: 82 CM/SEC
BH CV ECHO MEAS - MV DEC SLOPE: 386 CM/SEC2
BH CV ECHO MEAS - MV DEC TIME: 0.23 MSEC
BH CV ECHO MEAS - MV E MAX VEL: 87.3 CM/SEC
BH CV ECHO MEAS - MV E/A: 1.06
BH CV ECHO MEAS - MV MAX PG: 3.5 MMHG
BH CV ECHO MEAS - MV MEAN PG: 1.58 MMHG
BH CV ECHO MEAS - MV V2 VTI: 29 CM
BH CV ECHO MEAS - MVA(VTI): 2.04 CM2
BH CV ECHO MEAS - PA ACC TIME: 0.15 SEC
BH CV ECHO MEAS - PA PR(ACCEL): 9.6 MMHG
BH CV ECHO MEAS - PA V2 MAX: 186.5 CM/SEC
BH CV ECHO MEAS - PULM A REVS DUR: 0.1 SEC
BH CV ECHO MEAS - PULM A REVS VEL: 25.4 CM/SEC
BH CV ECHO MEAS - PULM DIAS VEL: 59.3 CM/SEC
BH CV ECHO MEAS - PULM S/D: 0.66
BH CV ECHO MEAS - PULM SYS VEL: 38.9 CM/SEC
BH CV ECHO MEAS - RV MAX PG: 1.84 MMHG
BH CV ECHO MEAS - RV V1 MAX: 67.8 CM/SEC
BH CV ECHO MEAS - RV V1 VTI: 16.9 CM
BH CV ECHO MEAS - RVDD: 2.8 CM
BH CV ECHO MEAS - SI(MOD-SP4): 29.2 ML/M2
BH CV ECHO MEAS - SV(LVOT): 59.3 ML
BH CV ECHO MEAS - SV(MOD-SP4): 69.1 ML
BUN SERPL-MCNC: 20 MG/DL (ref 8–23)
BUN/CREAT SERPL: 18.9 (ref 7–25)
CALCIUM SPEC-SCNC: 8.9 MG/DL (ref 8.6–10.5)
CHLORIDE SERPL-SCNC: 97 MMOL/L (ref 98–107)
CO2 SERPL-SCNC: 28 MMOL/L (ref 22–29)
CREAT SERPL-MCNC: 1.06 MG/DL (ref 0.76–1.27)
DEPRECATED RDW RBC AUTO: 50.8 FL (ref 37–54)
EGFRCR SERPLBLD CKD-EPI 2021: 76 ML/MIN/1.73
EOSINOPHIL # BLD AUTO: 0 10*3/MM3 (ref 0–0.4)
EOSINOPHIL NFR BLD AUTO: 0.1 % (ref 0.3–6.2)
ERYTHROCYTE [DISTWIDTH] IN BLOOD BY AUTOMATED COUNT: 15.1 % (ref 12.3–15.4)
GLUCOSE SERPL-MCNC: 137 MG/DL (ref 65–99)
HCT VFR BLD AUTO: 38.4 % (ref 37.5–51)
HGB BLD-MCNC: 12.5 G/DL (ref 13–17.7)
LYMPHOCYTES # BLD AUTO: 0.9 10*3/MM3 (ref 0.7–3.1)
LYMPHOCYTES NFR BLD AUTO: 9.7 % (ref 19.6–45.3)
MAXIMAL PREDICTED HEART RATE: 151 BPM
MCH RBC QN AUTO: 30 PG (ref 26.6–33)
MCHC RBC AUTO-ENTMCNC: 32.6 G/DL (ref 31.5–35.7)
MCV RBC AUTO: 92 FL (ref 79–97)
MONOCYTES # BLD AUTO: 1.2 10*3/MM3 (ref 0.1–0.9)
MONOCYTES NFR BLD AUTO: 12.8 % (ref 5–12)
NEUTROPHILS NFR BLD AUTO: 7.5 10*3/MM3 (ref 1.7–7)
NEUTROPHILS NFR BLD AUTO: 77.3 % (ref 42.7–76)
NRBC BLD AUTO-RTO: 0.1 /100 WBC (ref 0–0.2)
PLATELET # BLD AUTO: 220 10*3/MM3 (ref 140–450)
PMV BLD AUTO: 8.1 FL (ref 6–12)
POTASSIUM SERPL-SCNC: 4 MMOL/L (ref 3.5–5.2)
RBC # BLD AUTO: 4.17 10*6/MM3 (ref 4.14–5.8)
SODIUM SERPL-SCNC: 135 MMOL/L (ref 136–145)
STRESS TARGET HR: 128 BPM
TROPONIN T SERPL-MCNC: <0.01 NG/ML (ref 0–0.03)
WBC NRBC COR # BLD: 9.7 10*3/MM3 (ref 3.4–10.8)

## 2022-11-08 PROCEDURE — 36415 COLL VENOUS BLD VENIPUNCTURE: CPT

## 2022-11-08 PROCEDURE — A9502 TC99M TETROFOSMIN: HCPCS | Performed by: HOSPITALIST

## 2022-11-08 PROCEDURE — G0378 HOSPITAL OBSERVATION PER HR: HCPCS

## 2022-11-08 PROCEDURE — 93018 CV STRESS TEST I&R ONLY: CPT | Performed by: INTERNAL MEDICINE

## 2022-11-08 PROCEDURE — 85610 PROTHROMBIN TIME: CPT

## 2022-11-08 PROCEDURE — 84484 ASSAY OF TROPONIN QUANT: CPT | Performed by: HOSPITALIST

## 2022-11-08 PROCEDURE — 80048 BASIC METABOLIC PNL TOTAL CA: CPT | Performed by: HOSPITALIST

## 2022-11-08 PROCEDURE — 36415 COLL VENOUS BLD VENIPUNCTURE: CPT | Performed by: HOSPITALIST

## 2022-11-08 PROCEDURE — 25010000002 REGADENOSON 0.4 MG/5ML SOLUTION: Performed by: HOSPITALIST

## 2022-11-08 PROCEDURE — 78452 HT MUSCLE IMAGE SPECT MULT: CPT | Performed by: INTERNAL MEDICINE

## 2022-11-08 PROCEDURE — 93306 TTE W/DOPPLER COMPLETE: CPT

## 2022-11-08 PROCEDURE — 0 TECHNETIUM TETROFOSMIN KIT: Performed by: HOSPITALIST

## 2022-11-08 PROCEDURE — 99204 OFFICE O/P NEW MOD 45 MIN: CPT | Performed by: INTERNAL MEDICINE

## 2022-11-08 PROCEDURE — 93017 CV STRESS TEST TRACING ONLY: CPT

## 2022-11-08 PROCEDURE — 85730 THROMBOPLASTIN TIME PARTIAL: CPT

## 2022-11-08 PROCEDURE — 25010000002 ENOXAPARIN PER 10 MG: Performed by: HOSPITALIST

## 2022-11-08 PROCEDURE — 78452 HT MUSCLE IMAGE SPECT MULT: CPT

## 2022-11-08 PROCEDURE — 80048 BASIC METABOLIC PNL TOTAL CA: CPT

## 2022-11-08 PROCEDURE — 93306 TTE W/DOPPLER COMPLETE: CPT | Performed by: INTERNAL MEDICINE

## 2022-11-08 PROCEDURE — 85025 COMPLETE CBC W/AUTO DIFF WBC: CPT | Performed by: HOSPITALIST

## 2022-11-08 PROCEDURE — 96372 THER/PROPH/DIAG INJ SC/IM: CPT

## 2022-11-08 PROCEDURE — 85025 COMPLETE CBC W/AUTO DIFF WBC: CPT

## 2022-11-08 RX ADMIN — Medication 10 ML: at 20:22

## 2022-11-08 RX ADMIN — CEFDINIR 300 MG: 300 CAPSULE ORAL at 08:15

## 2022-11-08 RX ADMIN — METRONIDAZOLE 500 MG: 500 TABLET ORAL at 06:33

## 2022-11-08 RX ADMIN — LEVOTHYROXINE SODIUM 150 MCG: 0.15 TABLET ORAL at 06:33

## 2022-11-08 RX ADMIN — REGADENOSON 0.4 MG: 0.08 INJECTION, SOLUTION INTRAVENOUS at 10:40

## 2022-11-08 RX ADMIN — ENOXAPARIN SODIUM 40 MG: 100 INJECTION SUBCUTANEOUS at 15:57

## 2022-11-08 RX ADMIN — CEFDINIR 300 MG: 300 CAPSULE ORAL at 20:22

## 2022-11-08 RX ADMIN — TETROFOSMIN 1 DOSE: 1.38 INJECTION, POWDER, LYOPHILIZED, FOR SOLUTION INTRAVENOUS at 10:40

## 2022-11-08 RX ADMIN — ATORVASTATIN CALCIUM 10 MG: 10 TABLET, FILM COATED ORAL at 08:16

## 2022-11-08 RX ADMIN — ASPIRIN 81 MG: 81 TABLET, COATED ORAL at 08:15

## 2022-11-08 RX ADMIN — METRONIDAZOLE 500 MG: 500 TABLET ORAL at 21:48

## 2022-11-08 RX ADMIN — Medication 10 ML: at 08:21

## 2022-11-08 RX ADMIN — TETROFOSMIN 1 DOSE: 1.38 INJECTION, POWDER, LYOPHILIZED, FOR SOLUTION INTRAVENOUS at 09:56

## 2022-11-08 RX ADMIN — ALLOPURINOL 300 MG: 300 TABLET ORAL at 08:16

## 2022-11-08 RX ADMIN — PANTOPRAZOLE SODIUM 40 MG: 40 TABLET, DELAYED RELEASE ORAL at 06:33

## 2022-11-08 RX ADMIN — LOSARTAN POTASSIUM 100 MG: 25 TABLET, FILM COATED ORAL at 08:16

## 2022-11-08 RX ADMIN — METRONIDAZOLE 500 MG: 500 TABLET ORAL at 14:50

## 2022-11-08 NOTE — PROGRESS NOTES
Patient Story (Not Part of Legal Medical Record)         Other (Not Part of Legal Medical Record)         ED to Floor Handoff (Not Part of Legal Medical Record)   Nursing report ED to floor  Gray Huerta  71 y.o.  male    HPI:   Chief Complaint   Patient presents with    Chest Pain       Admitting doctor:   Muriel Bettencourt MD    Admitting diagnosis:   The primary encounter diagnosis was Chest pain, unspecified type. Diagnoses of Sepsis, due to unspecified organism, unspecified whether acute organ dysfunction present (HCC), Leukocytosis, unspecified type, and Abnormal nuclear stress test were also pertinent to this visit.    Code status:   Current Code Status       Date Active Code Status Order ID Comments User Context       Not on file            Allergies:   Patient has no known allergies.    Isolation:  No active isolations     Fall Risk:  Fall Risk Assessment was completed, and patient is at low risk for falls.   Predictive Model Details         18 (Low) Factor Value    Calculated 11/8/2022 10:10 Age 71    Risk of Fall Model Musculoskeletal Assessment WDL     Number of Distinct Medication Classes administered 11     Active Peripheral IV Present     Imaging order in this encounter Present     Drug Use Not Asked     Skin Assessment WDL     Magnesium not on file     Financial Class Medicare     Diastolic BP 63     Respiratory Rate 17     Roge Scale not on file     Chloride 97 mmol/L     Days after Admission 0.996     Peripheral Vascular Assessment WDL     Calcium 8.9 mg/dL     Cardiac Assessment X     Tobacco Use Not Asked     Sex Male     Number of administrations of Ulcer Drugs 1     Gastrointestinal Assessment WDL     ALT 45 U/L     Number of administrations of Anti-Hypertensives 1     Number of administrations of Anti-Coagulants 1     Total Bilirubin 0.4 mg/dL     Creatinine 1.06 mg/dL     Potassium 4 mmol/L     Albumin 4.8 g/dL         Weight:       11/07/22  1013   Weight: 111 kg (245 lb)       Intake and  Output    Intake/Output Summary (Last 24 hours) at 11/8/2022 1314  Last data filed at 11/8/2022 0800  Gross per 24 hour   Intake 740 ml   Output 500 ml   Net 240 ml       Diet:   Dietary Orders (From admission, onward)       Start     Ordered    11/08/22 1130  Diet Cardiac; Healthy Heart  Diet Effective Now        Question Answer Comment   Diet / Texture / Consistency Cardiac    Select Type: Healthy Heart        11/08/22 1130                     Most recent vitals:   Vitals:    11/08/22 1134 11/08/22 1201 11/08/22 1300 11/08/22 1301   BP: 136/51 146/69  153/71   Pulse: 69 72 65    Resp:       Temp:       TempSrc:       SpO2: 98% 99% 98%    Weight:       Height:           Active LDAs/IV Access:   Lines, Drains & Airways       Active LDAs       Name Placement date Placement time Site Days    Peripheral IV 11/07/22 1111 Left Antecubital 11/07/22  1111  Antecubital  1                    Skin Condition:   Skin Assessments (last day)       Date/Time Sensory Perception Moisture Activity Mobility Nutrition Friction and Shear Roge Score    11/08/22 0800 4-->no impairment 4-->rarely moist 3-->walks occasionally 3-->slightly limited 3-->adequate 3-->no apparent problem 20             Labs (abnormal labs have a star):   Labs Reviewed   COMPREHENSIVE METABOLIC PANEL - Abnormal; Notable for the following components:       Result Value    Glucose 125 (*)     ALT (SGPT) 45 (*)     All other components within normal limits    Narrative:     GFR Normal >60  Chronic Kidney Disease <60  Kidney Failure <15     APTT - Abnormal; Notable for the following components:    PTT 25.4 (*)     All other components within normal limits   CBC WITH AUTO DIFFERENTIAL - Abnormal; Notable for the following components:    WBC 17.70 (*)     All other components within normal limits    Narrative:     Modified report. Previous result was Hemogram on 11/7/2022 at 1124 EST.  The previously reported component NRBC is no longer being reported. Previous  result was 0.0 /100 WBC (Reference Range: 0.0-0.2 /100 WBC) on 11/7/2022 at 1124 EST.   MANUAL DIFFERENTIAL - Abnormal; Notable for the following components:    Lymphocyte % 5.0 (*)     Monocyte % 4.0 (*)     Bands %  13.0 (*)     Metamyelocyte % 7.0 (*)     Neutrophils Absolute 14.87 (*)     All other components within normal limits   D-DIMER, QUANTITATIVE - Abnormal; Notable for the following components:    D-Dimer, Quantitative 3.33 (*)     All other components within normal limits    Narrative:     Reference Range  --------------------------------------------------------------------     < 0.50   Negative Predictive Value  0.50-0.59   Indeterminate    >= 0.60   Probable VTE             A very low percentage of patients with DVT may yield D-Dimer results   below the cut-off of 0.50 mg/L FEU.  This is known to be more   prevalent in patients with distal DVT.             Results of this test should always be interpreted in conjunction with   the patient's medical history, clinical presentation and other   findings.  Clinical diagnosis should not be based on the result of   [place] D-Dimer alone.   BASIC METABOLIC PANEL - Abnormal; Notable for the following components:    Glucose 137 (*)     Sodium 135 (*)     Chloride 97 (*)     All other components within normal limits    Narrative:     GFR Normal >60  Chronic Kidney Disease <60  Kidney Failure <15     CBC WITH AUTO DIFFERENTIAL - Abnormal; Notable for the following components:    Hemoglobin 12.5 (*)     Neutrophil % 77.3 (*)     Lymphocyte % 9.7 (*)     Monocyte % 12.8 (*)     Eosinophil % 0.1 (*)     Neutrophils, Absolute 7.50 (*)     Monocytes, Absolute 1.20 (*)     All other components within normal limits   RESPIRATORY PANEL PCR W/ COVID-19 (SARS-COV-2) [facility] IN-HOUSE, NP [facility]AB IN [facility], 3-4 HR TAT - Normal    Narrative:     In the setting of a positive respiratory panel with a viral infection PLUS a negative procalcitonin without  other underlying concern for bacterial infection, consider observing off antibiotics or discontinuation of antibiotics and continue supportive care. If the respiratory panel is positive for atypical bacterial infection (Bordetella pertussis, Chlamydophila pneumoniae, or Mycoplasma pneumoniae), consider antibiotic de-escalation to target atypical bacterial infection.   PROTIME-INR - Normal   LIPASE - Normal   TROPONIN (IN-HOUSE) - Normal    Narrative:     Troponin T Reference Range:  <= 0.03 ng/mL-   Negative for AMI  >0.03 ng/mL-     Abnormal for myocardial necrosis.  Clinicians would have to utilize clinical acumen, EKG, Troponin and serial changes to determine if it is an Acute Myocardial Infarction or myocardial injury due to an underlying chronic condition.       Results may be falsely decreased if patient taking Biotin.     URINALYSIS W/ CULTURE IF INDICATED - Normal    Narrative:     In absence of clinical symptoms, the presence of pyuria, bacteria, and/or nitrites on the urinalysis result does not correlate with infection.  Urine microscopic not indicated.   TROPONIN (IN-HOUSE) - Normal    Narrative:     Troponin T Reference Range:  <= 0.03 ng/mL-   Negative for AMI  >0.03 ng/mL-     Abnormal for myocardial necrosis.  Clinicians would have to utilize clinical acumen, EKG, Troponin and serial changes to determine if it is an Acute Myocardial Infarction or myocardial injury due to an underlying chronic condition.       Results may be falsely decreased if patient taking Biotin.     POC LACTATE - Normal   BLOOD CULTURE   BLOOD CULTURE   SCAN SLIDE   POC LACTATE   CBC AND DIFFERENTIAL    Narrative:     The following orders were created for panel order CBC & Differential.  Procedure                               Abnormality         Status                     ---------                               -----------         ------                     CBC Auto Differential[[id number]]        Abnormal            Final result                Scan Slide[[id number]]                                       Final result                 Please view results for these tests on the individual orders.   EXTRA TUBES    Narrative:     The following orders were created for panel order Extra Tubes.  Procedure                               Abnormality         Status                     ---------                               -----------         ------                     Gold Top - SST[[id number]]                                   Final result                 Please view results for these tests on the individual orders.   GOLD TOP - SST   CBC AND DIFFERENTIAL    Narrative:     The following orders were created for panel order CBC & Differential.  Procedure                               Abnormality         Status                     ---------                               -----------         ------                     CBC Auto Differential[[id number]]        Abnormal            Final result                 Please view results for these tests on the individual orders.       LOC: Person, Place, Time, and Situation    Telemetry:  Med/Surg    Cardiac Monitoring Ordered: yes    EKG:   ECG 12 Lead Chest Pain   Preliminary Result   HEART RATE= 72  bpm   RR Interval= 832  ms   IN Interval= 155  ms   P Horizontal Axis= -11  deg   P Front Axis= 60  deg   QRSD Interval= 96  ms   QT Interval= 399  ms   QRS Axis= -36  deg   T Wave Axis= 57  deg   - OTHERWISE NORMAL ECG -   Sinus rhythm   Left axis deviation   Electronically Signed By:    Date and Time of Study: 2022-11-07 10:38:29          Medications Given in the ED:   Medications   sodium chloride 0.9 % flush 10 mL (has no administration in time range)   allopurinol (ZYLOPRIM) tablet 300 mg (300 mg Oral Given 11/8/22 0816)   losartan (COZAAR) tablet 100 mg (100 mg Oral Given 11/8/22 0816)   levothyroxine (SYNTHROID, LEVOTHROID) tablet 150 mcg (150 mcg Oral Given 11/8/22 0633)   nebivolol (BYSTOLIC) tablet 10 mg (10 mg  Oral Not Given 11/8/22 0816)   atorvastatin (LIPITOR) tablet 10 mg (10 mg Oral Given 11/8/22 0816)   pantoprazole (PROTONIX) EC tablet 40 mg (40 mg Oral Given 11/8/22 0633)   cefdinir (OMNICEF) capsule 300 mg (300 mg Oral Given 11/8/22 0815)   metroNIDAZOLE (FLAGYL) tablet 500 mg (500 mg Oral Given 11/8/22 0633)   aspirin EC tablet 81 mg (81 mg Oral Given 11/8/22 0815)   sodium chloride 0.9 % flush 10 mL (10 mL Intravenous Given 11/8/22 0821)   sodium chloride 0.9 % flush 10 mL (has no administration in time range)   Enoxaparin Sodium (LOVENOX) syringe 40 mg (40 mg Subcutaneous Given 11/7/22 2027)   melatonin tablet 5 mg (has no administration in time range)   ondansetron (ZOFRAN) tablet 4 mg (has no administration in time range)     Or   ondansetron (ZOFRAN) injection 4 mg (has no administration in time range)   acetaminophen (TYLENOL) tablet 650 mg (has no administration in time range)   HYDROcodone-acetaminophen (NORCO) 5-325 MG per tablet 1 tablet (has no administration in time range)   sennosides-docusate (PERICOLACE) 8.6-50 MG per tablet 2 tablet (has no administration in time range)     And   polyethylene glycol (MIRALAX) packet 17 g (has no administration in time range)     And   bisacodyl (DULCOLAX) EC tablet 5 mg (has no administration in time range)     And   bisacodyl (DULCOLAX) suppository 10 mg (has no administration in time range)   iopamidol (ISOVUE-370) 76 % injection 100 mL (100 mL Intravenous Given 11/7/22 1444)   cefepime 2 gm IVPB in 100 ml NS (MBP) (0 g Intravenous Stopped 11/7/22 1801)   technetium tetrofosmin (Tc-MYOVIEW) injection 1 dose (1 dose Intravenous Given 11/8/22 0956)   regadenoson (LEXISCAN) injection 0.4 mg (0.4 mg Intravenous Given 11/8/22 1040)   technetium tetrofosmin (Tc-MYOVIEW) injection 1 dose (1 dose Intravenous Given 11/8/22 1040)       Imaging results:  XR Chest 1 View    Result Date: 11/7/2022  Questionable 1.5 cm noncalcified nodular density in the periphery of the  right midlung, new since 2015. Dedicated CT chest is recommended for further evaluation at this time.  Electronically Signed By-Nory Galvan MD On:11/7/2022 11:27 AM This report was finalized on [id number] by  Nory Galvan MD.    CT Angiogram Chest Pulmonary Embolism    Result Date: 11/7/2022   1. No pulmonary embolism. 2. The questioned right lung nodule on previous chest x-ray corresponds to a benign centrally calcified nodule, consistent with old granulomatous disease. 3. There are additional smaller noncalcified nodules in both lungs measuring 6 mm or less in size. Following the Fleischner Society criteria for pulmonary nodule management, CT chest follow-up in 3-6 months is recommended. 4. Hepatic steatosis.    Electronically Signed By-Nory Galvan MD On:11/7/2022 2:54 PM This report was finalized on [id number] by  Nory Galvan MD.     Social issues:   Social History     Socioeconomic History    Marital status:        NIH Stroke Scale:  Interval: (not recorded)  1a. Level of Consciousness: (not recorded)  1b. LOC Questions: (not recorded)  1c. LOC Commands: (not recorded)  2. Best Gaze: (not recorded)  3. Visual: (not recorded)  4. Facial Palsy: (not recorded)  5a. Motor Arm, Left: (not recorded)  5b. Motor Arm, Right: (not recorded)  6a. Motor Leg, Left: (not recorded)  6b. Motor Leg, Right: (not recorded)  7. Limb Ataxia: (not recorded)  8. Sensory: (not recorded)  9. Best Language: (not recorded)  10. Dysarthria: (not recorded)  11. Extinction and Inattention (formerly Neglect): (not recorded)    Total (NIH Stroke Scale): (not recorded)     Additional notable assessment information:     Nursing report ED to floor:      Nadja Trevino RN   11/08/22 13:14 EST

## 2022-11-08 NOTE — CONSULTS
Referring Provider: Hospitalist  Reason for Consultation: Chest pain    Patient Care Team:  Venecia Kohler MD as PCP - General (Family Medicine)  Provider, No Known as PCP - Family Medicine    Chief complaint chest pain    Subjective .     History of present illness:  Gray Huerta is a 71 y.o. male with history of hypertension sleep apnea hyperlipidemia prediabetes presented to the hospital complains of abdominal pain diarrhea and also chest pain.  Chest pain is mostly left-sided without any radiation but also with some shortness of breath.  No complains any PND orthopnea.  No palpitation dizziness syncope or swelling of the feet.  He has been taking his medicines regularly.  In the emergency room patient had elevated D-dimer and had a CT scan which showed no pulmonary embolism.  Patient had a CT scan in the past also which showed coronary artery calcification.  Patient had a temperature of 98.7 and his vital signs were stable and he had leukocytosis with 13 bands and hence he was given antibiotics.  Patient is admitted and ruled out for MI by EKG and enzymes.    Review of Systems   Constitutional: Negative for fever and malaise/fatigue.   HENT: Negative for ear pain and nosebleeds.    Eyes: Negative for blurred vision and double vision.   Cardiovascular: Positive for chest pain. Negative for dyspnea on exertion and palpitations.   Respiratory: Positive for shortness of breath. Negative for cough.    Skin: Negative for rash.   Musculoskeletal: Negative for joint pain.   Gastrointestinal: Positive for abdominal pain and diarrhea. Negative for nausea and vomiting.   Neurological: Negative for focal weakness and headaches.   Psychiatric/Behavioral: Negative for depression. The patient is not nervous/anxious.    All other systems reviewed and are negative.      History  No past medical history on file.    No past surgical history on file.    No family history on file.          (Not in a hospital  "admission)        Patient has no known allergies.    Scheduled Meds:allopurinol, 300 mg, Oral, Daily  aspirin, 81 mg, Oral, Daily  atorvastatin, 10 mg, Oral, Daily  cefdinir, 300 mg, Oral, Q12H  enoxaparin, 40 mg, Subcutaneous, Daily  levothyroxine, 150 mcg, Oral, Q AM  losartan, 100 mg, Oral, Q24H  metroNIDAZOLE, 500 mg, Oral, Q8H  nebivolol, 10 mg, Oral, Daily  pantoprazole, 40 mg, Oral, Q AM  sodium chloride, 10 mL, Intravenous, Q12H      Continuous Infusions:   PRN Meds:.•  acetaminophen  •  senna-docusate sodium **AND** polyethylene glycol **AND** bisacodyl **AND** bisacodyl  •  HYDROcodone-acetaminophen  •  melatonin  •  ondansetron **OR** ondansetron  •  [COMPLETED] Insert peripheral IV **AND** sodium chloride  •  sodium chloride    Objective     VITAL SIGNS  Vitals:    11/08/22 0602 11/08/22 0636 11/08/22 0701 11/08/22 0801   BP: 121/60  124/61 126/62   Pulse: 71  73 74   Resp:  17     Temp:       TempSrc:       SpO2: 96%  93% 96%   Weight:       Height:           Flowsheet Rows    Flowsheet Row First Filed Value   Admission Height 188 cm (74\") Documented at 11/07/2022 1013   Admission Weight 111 kg (245 lb) Documented at 11/07/2022 1013           TELEMETRY: Normal sinus rhythm    Physical Exam:  Constitutional:       Appearance: Well-developed.   Eyes:      General: No scleral icterus.     Conjunctiva/sclera: Conjunctivae normal.      Pupils: Pupils are equal, round, and reactive to light.   HENT:      Head: Normocephalic and atraumatic.   Neck:      Vascular: No carotid bruit or JVD.   Pulmonary:      Effort: Pulmonary effort is normal.      Breath sounds: Normal breath sounds. No wheezing. No rales.   Cardiovascular:      Normal rate. Regular rhythm.   Pulses:     Intact distal pulses.   Abdominal:      General: Bowel sounds are normal.      Palpations: Abdomen is soft.   Musculoskeletal: Normal range of motion.      Cervical back: Normal range of motion and neck supple. Skin:     General: Skin is warm " and dry.      Findings: No rash.   Neurological:      Mental Status: Alert.      Comments: No focal deficits          Results Review:   I reviewed the patient's new clinical results.  Lab Results (last 24 hours)     Procedure Component Value Units Date/Time    Troponin [[id number]]  (Normal) Collected: 11/08/22 0910    Specimen: Blood Updated: 11/08/22 0950     Troponin T <0.010 ng/mL     Narrative:      Troponin T Reference Range:  <= 0.03 ng/mL-   Negative for AMI  >0.03 ng/mL-     Abnormal for myocardial necrosis.  Clinicians would have to utilize clinical acumen, EKG, Troponin and serial changes to determine if it is an Acute Myocardial Infarction or myocardial injury due to an underlying chronic condition.       Results may be falsely decreased if patient taking Biotin.      Basic Metabolic Panel [[id number]]  (Abnormal) Collected: 11/08/22 0535    Specimen: Blood Updated: 11/08/22 0653     Glucose 137 mg/dL      BUN 20 mg/dL      Creatinine 1.06 mg/dL      Sodium 135 mmol/L      Potassium 4.0 mmol/L      Chloride 97 mmol/L      CO2 28.0 mmol/L      Calcium 8.9 mg/dL      BUN/Creatinine Ratio 18.9     Anion Gap 10.0 mmol/L      eGFR 76.0 mL/min/1.73      Comment: National Kidney Foundation and American Society of Nephrology (ASN) Task Force recommended calculation based on the Chronic Kidney Disease Epidemiology Collaboration (CKD-EPI) equation refit without adjustment for race.       Narrative:      GFR Normal >60  Chronic Kidney Disease <60  Kidney Failure <15      CBC & Differential [[id number]]  (Abnormal) Collected: 11/08/22 0535    Specimen: Blood Updated: 11/08/22 0641    Narrative:      The following orders were created for panel order CBC & Differential.  Procedure                               Abnormality         Status                     ---------                               -----------         ------                     CBC Auto Differential[[id number]]        Abnormal            Final result                  Please view results for these tests on the individual orders.    CBC Auto Differential [[id number]]  (Abnormal) Collected: 11/08/22 0535    Specimen: Blood Updated: 11/08/22 0641     WBC 9.70 10*3/mm3      RBC 4.17 10*6/mm3      Hemoglobin 12.5 g/dL      Comment: Result checked        Hematocrit 38.4 %      MCV 92.0 fL      MCH 30.0 pg      MCHC 32.6 g/dL      RDW 15.1 %      RDW-SD 50.8 fl      MPV 8.1 fL      Platelets 220 10*3/mm3      Neutrophil % 77.3 %      Lymphocyte % 9.7 %      Monocyte % 12.8 %      Eosinophil % 0.1 %      Basophil % 0.1 %      Neutrophils, Absolute 7.50 10*3/mm3      Lymphocytes, Absolute 0.90 10*3/mm3      Monocytes, Absolute 1.20 10*3/mm3      Eosinophils, Absolute 0.00 10*3/mm3      Basophils, Absolute 0.00 10*3/mm3      nRBC 0.1 /100 WBC     Respiratory Panel PCR w/COVID-19(SARS-CoV-2) [facility]O/JEREMIAS/PAD/[facility]/MAD/MARCIAL In-House, NP Swab in [facility], 3-4 HR TAT - Swab, Nasopharynx [[id number]]  (Normal) Collected: 11/07/22 1527    Specimen: Swab from Nasopharynx Updated: 11/07/22 1621     ADENOVIRUS, PCR Not Detected     Coronavirus 229E Not Detected     Coronavirus HKU1 Not Detected     Coronavirus NL63 Not Detected     Coronavirus OC43 Not Detected     COVID19 Not Detected     Human Metapneumovirus Not Detected     Human Rhinovirus/Enterovirus Not Detected     Influenza A PCR Not Detected     Influenza B PCR Not Detected     Parainfluenza Virus 1 Not Detected     Parainfluenza Virus 2 Not Detected     Parainfluenza Virus 3 Not Detected     Parainfluenza Virus 4 Not Detected     RSV, PCR Not Detected     Bordetella pertussis pcr Not Detected     Bordetella parapertussis PCR Not Detected     Chlamydophila pneumoniae PCR Not Detected     Mycoplasma pneumo by PCR Not Detected    Narrative:      In the setting of a positive respiratory panel with a viral infection PLUS a negative procalcitonin without other underlying concern for bacterial infection, consider observing off antibiotics or  discontinuation of antibiotics and continue supportive care. If the respiratory panel is positive for atypical bacterial infection (Bordetella pertussis, Chlamydophila pneumoniae, or Mycoplasma pneumoniae), consider antibiotic de-escalation to target atypical bacterial infection.    Urinalysis With Culture If Indicated - Urine, Clean Catch [[id number]]  (Normal) Collected: 11/07/22 1527    Specimen: Urine, Clean Catch Updated: 11/07/22 1534     Color, UA Yellow     Appearance, UA Clear     pH, UA <=5.0     Specific Gravity, UA 1.028     Glucose, UA Negative     Ketones, UA Negative     Bilirubin, UA Negative     Blood, UA Negative     Protein, UA Negative     Leuk Esterase, UA Negative     Nitrite, UA Negative     Urobilinogen, UA 0.2 E.U./dL    Narrative:      In absence of clinical symptoms, the presence of pyuria, bacteria, and/or nitrites on the urinalysis result does not correlate with infection.  Urine microscopic not indicated.    POC Lactate [[id number]]  (Normal) Collected: 11/07/22 1520    Specimen: Blood Updated: 11/07/22 1521     Lactate 1.6 mmol/L      Comment: Serial Number: [id number]:  [id number]       Blood Culture - Blood, Arm, Left [[id number]] Collected: 11/07/22 1515    Specimen: Blood from Arm, Left Updated: 11/07/22 1519    Blood Culture - Blood, Arm, Right [[id number]] Collected: 11/07/22 1515    Specimen: Blood from Arm, Right Updated: 11/07/22 1518    D-dimer, Quantitative [[id number]]  (Abnormal) Collected: 11/07/22 1110    Specimen: Blood Updated: 11/07/22 1222     D-Dimer, Quantitative 3.33 mg/L (FEU)     Narrative:      Reference Range  --------------------------------------------------------------------     < 0.50   Negative Predictive Value  0.50-0.59   Indeterminate    >= 0.60   Probable VTE             A very low percentage of patients with DVT may yield D-Dimer results   below the cut-off of 0.50 mg/L FEU.  This is known to be more   prevalent in patients with distal DVT.              Results of this test should always be interpreted in conjunction with   the patient's medical history, clinical presentation and other   findings.  Clinical diagnosis should not be based on the result of   [place] D-Dimer alone.    Protime-INR [[id number]]  (Normal) Collected: 11/07/22 1110    Specimen: Blood Updated: 11/07/22 1213     Protime 9.7 Seconds      INR 0.94    aPTT [[id number]]  (Abnormal) Collected: 11/07/22 1110    Specimen: Blood Updated: 11/07/22 1213     PTT 25.4 seconds     Manual Differential [[id number]]  (Abnormal) Collected: 11/07/22 1110    Specimen: Blood Updated: 11/07/22 1156     Neutrophil % 71.0 %      Lymphocyte % 5.0 %      Monocyte % 4.0 %      Bands %  13.0 %      Metamyelocyte % 7.0 %      Neutrophils Absolute 14.87 10*3/mm3      Lymphocytes Absolute 0.89 10*3/mm3      Monocytes Absolute 0.71 10*3/mm3      RBC Morphology Normal     Vacuolated Neutrophils Slight/1+     Platelet Morphology Normal    CBC & Differential [[id number]]  (Abnormal) Collected: 11/07/22 1110    Specimen: Blood Updated: 11/07/22 1156    Narrative:      The following orders were created for panel order CBC & Differential.  Procedure                               Abnormality         Status                     ---------                               -----------         ------                     CBC Auto Differential[[id number]]        Abnormal            Final result               Scan Slide[[id number]]                                       Final result                 Please view results for these tests on the individual orders.    CBC Auto Differential [[id number]]  (Abnormal) Collected: 11/07/22 1110    Specimen: Blood Updated: 11/07/22 1156     WBC 17.70 10*3/mm3      RBC 4.84 10*6/mm3      Hemoglobin 14.8 g/dL      Hematocrit 45.2 %      MCV 93.4 fL      MCH 30.6 pg      MCHC 32.7 g/dL      RDW 14.7 %      RDW-SD 47.7 fl      MPV 7.9 fL      Platelets 265 10*3/mm3     Narrative:      Modified report. Previous  result was Hemogram on 11/7/2022 at 1124 EST.  The previously reported component NRBC is no longer being reported. Previous result was 0.0 /100 WBC (Reference Range: 0.0-0.2 /100 WBC) on 11/7/2022 at 1124 EST.    Scan Slide [[id number]] Collected: 11/07/22 1110    Specimen: Blood Updated: 11/07/22 1156     Scan Slide --     Comment: See Manual Differential Results       Comprehensive Metabolic Panel [[id number]]  (Abnormal) Collected: 11/07/22 1110    Specimen: Blood Updated: 11/07/22 1136     Glucose 125 mg/dL      BUN 18 mg/dL      Creatinine 0.97 mg/dL      Sodium 141 mmol/L      Potassium 4.5 mmol/L      Comment: Slight hemolysis detected by analyzer. Results may be affected.        Chloride 103 mmol/L      CO2 27.0 mmol/L      Calcium 9.8 mg/dL      Total Protein 7.9 g/dL      Albumin 4.80 g/dL      ALT (SGPT) 45 U/L      AST (SGOT) 25 U/L      Alkaline Phosphatase 90 U/L      Total Bilirubin 0.4 mg/dL      Globulin 3.1 gm/dL      A/G Ratio 1.5 g/dL      BUN/Creatinine Ratio 18.6     Anion Gap 11.0 mmol/L      eGFR 84.5 mL/min/1.73      Comment: National Kidney Foundation and American Society of Nephrology (ASN) Task Force recommended calculation based on the Chronic Kidney Disease Epidemiology Collaboration (CKD-EPI) equation refit without adjustment for race.       Narrative:      GFR Normal >60  Chronic Kidney Disease <60  Kidney Failure <15      Lipase [[id number]]  (Normal) Collected: 11/07/22 1110    Specimen: Blood Updated: 11/07/22 1136     Lipase 38 U/L     Troponin [[id number]]  (Normal) Collected: 11/07/22 1110    Specimen: Blood Updated: 11/07/22 1136     Troponin T <0.010 ng/mL     Narrative:      Troponin T Reference Range:  <= 0.03 ng/mL-   Negative for AMI  >0.03 ng/mL-     Abnormal for myocardial necrosis.  Clinicians would have to utilize clinical acumen, EKG, Troponin and serial changes to determine if it is an Acute Myocardial Infarction or myocardial injury due to an underlying chronic  condition.       Results may be falsely decreased if patient taking Biotin.      Extra Tubes [[id number]] Collected: 11/07/22 1110    Specimen: Blood, Venous Line Updated: 11/07/22 1131    Narrative:      The following orders were created for panel order Extra Tubes.  Procedure                               Abnormality         Status                     ---------                               -----------         ------                     Gold Top - SST[[id number]]                                   Final result                 Please view results for these tests on the individual orders.    Gold Top - SST [[id number]] Collected: 11/07/22 1110    Specimen: Blood Updated: 11/07/22 1131     Extra Tube hide          Imaging Results (Last 24 Hours)     Procedure Component Value Units Date/Time    CT Angiogram Chest Pulmonary Embolism [[id number]] Collected: 11/07/22 1449     Updated: 11/07/22 1456    Narrative:      CT ANGIOGRAM CHEST PULMONARY EMBOLISM-     Date of Exam: 11/7/2022 2:29 PM     Indication: Chest pain, elevated D-dimer.     Comparison: AP portable chest 11/7/2022. No prior CT chest for  comparison.     Technique: Serial and axial CT images of the chest were obtained  following the uneventful intravenous administration of 100 cc Isovue-370  contrast. Reconstructions in the coronal and sagittal planes were also  performed. In addition, a 3 D volume rendered image was obtained after  post processing. Automated exposure control and iterative reconstruction  methods were used.     FINDINGS:     There is no pulmonary embolism. No thoracic aortic aneurysm or  dissection is seen. Mild coronary artery calcifications are present.  Heart size is within normal limits.     1.4 cm right lower lobe nodule with central coarse calcification is  consistent with benign granulomatous changes. Tiny smaller smaller 2 mm  satellite nodules are also suggested with old granulomatous disease.  These findings correspond to the  questioned nodule on chest x-ray  performed earlier today.     There is a 6 mm noncalcified nodule located at the medial subpleural  margin of the right upper lobe (series 5 image 58). There is a 3 mm  noncalcified nodule within the left upper lobe (series 5 image 37). A 4  mm subpleural nodule is seen posteriorly in the left lower lobe (series  5 image 82).     There is no acute airspace disease. No abnormal bronchial wall  thickening or bronchiectasis is identified.        There is a mildly enlarged right hilar lymph node measuring 1.2 cm short  axis (series 4 image 91).     Heart size is normal. Mild coronary artery calcification is present.  There is no pericardial effusion or pleural effusion.     The liver appears steatotic. A right renal cyst measures 3.8 cm. 1.4 cm  left renal cyst is seen. The remainder of included upper abdominal  organs have a normal appearance.     There are no acute or suspicious osseous abnormalities.             Impression:         1. No pulmonary embolism.  2. The questioned right lung nodule on previous chest x-ray corresponds  to a benign centrally calcified nodule, consistent with old  granulomatous disease.  3. There are additional smaller noncalcified nodules in both lungs  measuring 6 mm or less in size. Following the Fleischner Society  criteria for pulmonary nodule management, CT chest follow-up in 3-6  months is recommended.  4. Hepatic steatosis.           Electronically Signed By-Nory Galvan MD On:11/7/2022 2:54 PM  This report was finalized on [id number] by  Nory Galvan MD.    XR Chest 1 View [[id number]] Collected: 11/07/22 1125     Updated: 11/07/22 1129    Narrative:      DATE OF EXAM:  11/7/2022 11:12 AM     PROCEDURE:  XR CHEST 1 VW-     INDICATIONS:  chest pain       COMPARISON:  AP portable chest 2/7/2015.     TECHNIQUE:   Single radiographic view of the chest was obtained.     FINDINGS:  No acute airspace disease is identified. There is a 1.5 cm  nodular  density over the periphery of the right midlung which is new since the  prior study. This nodule is not definitely calcified. The left lung  remains clear. Heart size is normal. There is no pleural effusion or  pneumothorax or acute osseous abnormality.       Impression:      Questionable 1.5 cm noncalcified nodular density in the periphery of the  right midlung, new since 2015. Dedicated CT chest is recommended for  further evaluation at this time.     Electronically Signed By-Nory Galvan MD On:11/7/2022 11:27 AM  This report was finalized on [id number] by  Nory Galvan MD.          EKG      I personally viewed and interpreted the patient's EKG/Telemetry data:    ECHOCARDIOGRAM:      STRESS MYOVIEW:    CARDIAC CATHETERIZATION:    OTHER:         Assessment & Plan     Principal Problem:    Chest pain, unspecified type  Hypertension  Hyperlipidemia  Hypothyroidism    Patient presented with chest pain which is atypical for angina but has risk factors of coronary disease  Patient also has abdominal pain and had a mild diverticulitis and is on medical therapy with antibiotics  Patient is ruled out for MI by EKG and enzymes  Patient is having an echocardiogram and a stress mostly to rule out ischemia  Blood pressure [place] stable  Patient is currently on a statin for hyperlipidemia.    I discussed the patients findings and my recommendations with patient and nurse    Td Batista MD  11/08/22  09:55 EST

## 2022-11-08 NOTE — CASE MANAGEMENT/SOCIAL WORK
Discharge Planning Assessment   Mark     Patient Name: Gray Huerta  MRN: [id number]  Today's Date: 11/8/2022    Admit Date: 11/7/2022    Plan: DC Plan: Return home, family to transport.   Discharge Needs Assessment     Row Name 11/08/22 1234       Living Environment    People in Home alone    Unique Family Situation 3 sisters or son if needed for help    Current Living Arrangements home    Primary Care Provided by self    Provides Primary Care For no one    Family Caregiver if Needed sibling(s)    Quality of Family Relationships unable to assess    Able to Return to Prior Arrangements yes       Resource/Environmental Concerns    Resource/Environmental Concerns none    Transportation Concerns none       Transition Planning    Patient/Family Anticipates Transition to home    Patient/Family Anticipated Services at Transition none    Transportation Anticipated family or friend will provide       Discharge Needs Assessment    Readmission Within the Last 30 Days no previous admission in last 30 days    Equipment Currently Used at Home bipap    Concerns to be Addressed denies needs/concerns at this time    Anticipated Changes Related to Illness none    Equipment Needed After Discharge none    Discharge Coordination/Progress DC Plan: Return home, family to transport.               Discharge Plan     Row Name 11/08/22 1236       Plan    Plan DC Plan: Return home, family to transport.    Plan Comments Stress test, ECHO completed. Awaiting results.Confirmed insurance and Venecia Kohler PCP. [facility] [address], declineds Meds to Beds.              Continued Care and Services - Admitted Since 11/7/2022    Coordination has not been started for this encounter.       Expected Discharge Date and Time     Expected Discharge Date Expected Discharge Time    Nov 8, 2022          Demographic Summary     Row Name 11/08/22 1232       General Information    Admission Type observation    Arrived From emergency department     Required Notices Provided Observation Status Notice    Referral Source emergency department    Reason for Consult discharge planning    Preferred Language English    General Information Comments Spoke to pt in room.               Functional Status     Row Name 11/08/22 1233       Functional Status    Usual Activity Tolerance good    Current Activity Tolerance good       Functional Status, IADL    Medications independent    Meal Preparation independent    Housekeeping independent    Laundry independent    Shopping independent       Mental Status    General Appearance WDL WDL       Mental Status Summary    Recent Changes in Mental Status/Cognitive Functioning no changes              Met with patient in room wearing PPE: mask.      Maintained distance greater than six feet and spent less than 15 minutes in the room.               Michelle Lewis, RN

## 2022-11-09 VITALS
DIASTOLIC BLOOD PRESSURE: 70 MMHG | HEART RATE: 65 BPM | WEIGHT: 240.74 LBS | SYSTOLIC BLOOD PRESSURE: 140 MMHG | OXYGEN SATURATION: 98 % | TEMPERATURE: 97.8 F | RESPIRATION RATE: 18 BRPM | BODY MASS INDEX: 30.9 KG/M2 | HEIGHT: 74 IN

## 2022-11-09 LAB
ANION GAP SERPL CALCULATED.3IONS-SCNC: 13 MMOL/L (ref 5–15)
APTT PPP: 29.6 SECONDS (ref 24–31)
BASOPHILS # BLD AUTO: 0 10*3/MM3 (ref 0–0.2)
BASOPHILS NFR BLD AUTO: 0.4 % (ref 0–1.5)
BH CV NUCLEAR PRIOR STUDY: 3
BH CV REST NUCLEAR ISOTOPE DOSE: 10.2 MCI
BH CV STRESS BP STAGE 1: NORMAL
BH CV STRESS BP STAGE 2: NORMAL
BH CV STRESS BP STAGE 3: NORMAL
BH CV STRESS BP STAGE 4: NORMAL
BH CV STRESS COMMENTS STAGE 1: NORMAL
BH CV STRESS COMMENTS STAGE 2: NORMAL
BH CV STRESS DOSE REGADENOSON STAGE 1: 0.4
BH CV STRESS DURATION MIN STAGE 1: 0
BH CV STRESS DURATION MIN STAGE 2: 4
BH CV STRESS DURATION SEC STAGE 1: 10
BH CV STRESS DURATION SEC STAGE 2: 0
BH CV STRESS HR STAGE 1: 79
BH CV STRESS HR STAGE 2: 86
BH CV STRESS HR STAGE 3: 84
BH CV STRESS HR STAGE 4: 78
BH CV STRESS NUCLEAR ISOTOPE DOSE: 32.9 MCI
BH CV STRESS PROTOCOL 1: NORMAL
BH CV STRESS RECOVERY BP: NORMAL MMHG
BH CV STRESS RECOVERY HR: 76 BPM
BH CV STRESS STAGE 1: 1
BH CV STRESS STAGE 2: 2
BH CV STRESS STAGE 3: 3
BH CV STRESS STAGE 4: 4
BUN SERPL-MCNC: 16 MG/DL (ref 8–23)
BUN/CREAT SERPL: 16.8 (ref 7–25)
CALCIUM SPEC-SCNC: 9.1 MG/DL (ref 8.6–10.5)
CHLORIDE SERPL-SCNC: 99 MMOL/L (ref 98–107)
CO2 SERPL-SCNC: 26 MMOL/L (ref 22–29)
CREAT SERPL-MCNC: 0.95 MG/DL (ref 0.76–1.27)
DEPRECATED RDW RBC AUTO: 48.1 FL (ref 37–54)
EGFRCR SERPLBLD CKD-EPI 2021: 86.6 ML/MIN/1.73
EOSINOPHIL # BLD AUTO: 0.1 10*3/MM3 (ref 0–0.4)
EOSINOPHIL NFR BLD AUTO: 1 % (ref 0.3–6.2)
ERYTHROCYTE [DISTWIDTH] IN BLOOD BY AUTOMATED COUNT: 14.9 % (ref 12.3–15.4)
GLUCOSE SERPL-MCNC: 118 MG/DL (ref 65–99)
HCT VFR BLD AUTO: 39.5 % (ref 37.5–51)
HGB BLD-MCNC: 12.7 G/DL (ref 13–17.7)
INR PPP: 0.99 (ref 0.93–1.1)
LV EF NUC BP: 56 %
LYMPHOCYTES # BLD AUTO: 1.6 10*3/MM3 (ref 0.7–3.1)
LYMPHOCYTES NFR BLD AUTO: 18.7 % (ref 19.6–45.3)
MAXIMAL PREDICTED HEART RATE: 151 BPM
MCH RBC QN AUTO: 30 PG (ref 26.6–33)
MCHC RBC AUTO-ENTMCNC: 32.2 G/DL (ref 31.5–35.7)
MCV RBC AUTO: 93.1 FL (ref 79–97)
MONOCYTES # BLD AUTO: 1.1 10*3/MM3 (ref 0.1–0.9)
MONOCYTES NFR BLD AUTO: 13.4 % (ref 5–12)
NEUTROPHILS NFR BLD AUTO: 5.5 10*3/MM3 (ref 1.7–7)
NEUTROPHILS NFR BLD AUTO: 66.5 % (ref 42.7–76)
NRBC BLD AUTO-RTO: 0 /100 WBC (ref 0–0.2)
PERCENT MAX PREDICTED HR: 51.66 %
PLATELET # BLD AUTO: 237 10*3/MM3 (ref 140–450)
PMV BLD AUTO: 8 FL (ref 6–12)
POTASSIUM SERPL-SCNC: 3.7 MMOL/L (ref 3.5–5.2)
PROTHROMBIN TIME: 10.2 SECONDS (ref 9.6–11.7)
RBC # BLD AUTO: 4.24 10*6/MM3 (ref 4.14–5.8)
SODIUM SERPL-SCNC: 138 MMOL/L (ref 136–145)
STRESS BASELINE BP: NORMAL MMHG
STRESS BASELINE HR: 66 BPM
STRESS PERCENT HR: 61 %
STRESS POST PEAK BP: NORMAL MMHG
STRESS POST PEAK HR: 78 BPM
STRESS TARGET HR: 128 BPM
WBC NRBC COR # BLD: 8.3 10*3/MM3 (ref 3.4–10.8)

## 2022-11-09 PROCEDURE — C1894 INTRO/SHEATH, NON-LASER: HCPCS | Performed by: INTERNAL MEDICINE

## 2022-11-09 PROCEDURE — 99152 MOD SED SAME PHYS/QHP 5/>YRS: CPT | Performed by: INTERNAL MEDICINE

## 2022-11-09 PROCEDURE — 99214 OFFICE O/P EST MOD 30 MIN: CPT | Performed by: INTERNAL MEDICINE

## 2022-11-09 PROCEDURE — C1760 CLOSURE DEV, VASC: HCPCS | Performed by: INTERNAL MEDICINE

## 2022-11-09 PROCEDURE — G0378 HOSPITAL OBSERVATION PER HR: HCPCS

## 2022-11-09 PROCEDURE — 25010000002 MIDAZOLAM PER 1 MG: Performed by: INTERNAL MEDICINE

## 2022-11-09 PROCEDURE — C1769 GUIDE WIRE: HCPCS | Performed by: INTERNAL MEDICINE

## 2022-11-09 PROCEDURE — 25010000002 FENTANYL CITRATE (PF) 100 MCG/2ML SOLUTION: Performed by: INTERNAL MEDICINE

## 2022-11-09 PROCEDURE — 93458 L HRT ARTERY/VENTRICLE ANGIO: CPT | Performed by: INTERNAL MEDICINE

## 2022-11-09 PROCEDURE — 0 IOPAMIDOL PER 1 ML: Performed by: INTERNAL MEDICINE

## 2022-11-09 RX ORDER — AMOXICILLIN AND CLAVULANATE POTASSIUM 875; 125 MG/1; MG/1
1 TABLET, FILM COATED ORAL 2 TIMES DAILY
Qty: 10 TABLET | Refills: 0 | Status: SHIPPED | OUTPATIENT
Start: 2022-11-09 | End: 2022-11-14

## 2022-11-09 RX ORDER — LIDOCAINE HYDROCHLORIDE 20 MG/ML
INJECTION, SOLUTION INFILTRATION; PERINEURAL
Status: DISCONTINUED | OUTPATIENT
Start: 2022-11-09 | End: 2022-11-09 | Stop reason: HOSPADM

## 2022-11-09 RX ORDER — SODIUM CHLORIDE 9 MG/ML
250 INJECTION, SOLUTION INTRAVENOUS ONCE AS NEEDED
Status: DISCONTINUED | OUTPATIENT
Start: 2022-11-09 | End: 2022-11-09 | Stop reason: HOSPADM

## 2022-11-09 RX ORDER — MIDAZOLAM HYDROCHLORIDE 1 MG/ML
INJECTION INTRAMUSCULAR; INTRAVENOUS
Status: DISCONTINUED | OUTPATIENT
Start: 2022-11-09 | End: 2022-11-09 | Stop reason: HOSPADM

## 2022-11-09 RX ORDER — FENTANYL CITRATE 50 UG/ML
INJECTION, SOLUTION INTRAMUSCULAR; INTRAVENOUS
Status: DISCONTINUED | OUTPATIENT
Start: 2022-11-09 | End: 2022-11-09 | Stop reason: HOSPADM

## 2022-11-09 RX ORDER — PANTOPRAZOLE SODIUM 40 MG/1
40 TABLET, DELAYED RELEASE ORAL
Qty: 30 TABLET | Refills: 0 | Status: SHIPPED | OUTPATIENT
Start: 2022-11-10 | End: 2022-12-10

## 2022-11-09 RX ORDER — ACETAMINOPHEN 325 MG/1
650 TABLET ORAL EVERY 4 HOURS PRN
Status: DISCONTINUED | OUTPATIENT
Start: 2022-11-09 | End: 2022-11-09

## 2022-11-09 RX ORDER — FENTANYL CITRATE 50 UG/ML
25 INJECTION, SOLUTION INTRAMUSCULAR; INTRAVENOUS ONCE
Status: DISCONTINUED | OUTPATIENT
Start: 2022-11-09 | End: 2022-11-09 | Stop reason: HOSPADM

## 2022-11-09 RX ORDER — MIDAZOLAM HYDROCHLORIDE 1 MG/ML
1 INJECTION INTRAMUSCULAR; INTRAVENOUS ONCE
Status: DISCONTINUED | OUTPATIENT
Start: 2022-11-09 | End: 2022-11-09 | Stop reason: HOSPADM

## 2022-11-09 RX ORDER — SODIUM CHLORIDE 9 MG/ML
75 INJECTION, SOLUTION INTRAVENOUS CONTINUOUS
Status: DISCONTINUED | OUTPATIENT
Start: 2022-11-09 | End: 2022-11-09 | Stop reason: HOSPADM

## 2022-11-09 RX ADMIN — ATORVASTATIN CALCIUM 10 MG: 10 TABLET, FILM COATED ORAL at 08:05

## 2022-11-09 RX ADMIN — NEBIVOLOL 10 MG: 10 TABLET ORAL at 08:05

## 2022-11-09 RX ADMIN — METRONIDAZOLE 500 MG: 500 TABLET ORAL at 15:14

## 2022-11-09 RX ADMIN — CEFDINIR 300 MG: 300 CAPSULE ORAL at 08:05

## 2022-11-09 RX ADMIN — SODIUM CHLORIDE 75 ML/HR: 0.9 INJECTION, SOLUTION INTRAVENOUS at 15:15

## 2022-11-09 RX ADMIN — ASPIRIN 81 MG: 81 TABLET, COATED ORAL at 08:05

## 2022-11-09 RX ADMIN — METRONIDAZOLE 500 MG: 500 TABLET ORAL at 05:35

## 2022-11-09 RX ADMIN — ALLOPURINOL 300 MG: 300 TABLET ORAL at 08:05

## 2022-11-09 RX ADMIN — Medication 10 ML: at 08:05

## 2022-11-09 RX ADMIN — PANTOPRAZOLE SODIUM 40 MG: 40 TABLET, DELAYED RELEASE ORAL at 05:35

## 2022-11-09 RX ADMIN — LEVOTHYROXINE SODIUM 150 MCG: 0.15 TABLET ORAL at 05:35

## 2022-11-09 RX ADMIN — LOSARTAN POTASSIUM 100 MG: 25 TABLET, FILM COATED ORAL at 08:05

## 2022-11-09 NOTE — PROGRESS NOTES
[facility] Medicine Services   Daily Progress Note    Patient Name: Gray Huerta  : 1950  MRN: [id number]  Primary Care Physician:  Venecia Kohler MD  Date of admission: 2022  Date and Time of Service:       Subjective      Patient feeling better  No chest pain  No abdominal pain   No diarrhea  No sob        Objective      Vitals:   Temp:  [98.2 °F (36.8 °C)] 98.2 °F (36.8 °C)  Heart Rate:  [65-79] 70  Resp:  [15-17] 15  BP: (120-154)/(51-74) 154/74    Physical Exam   General: No acute distress  HEENT: neck supple, normal oral mucosa, no masses, no lymphadenopathy  Lungs: Clear bilaterally, no wheezing, No crackles, No Rhonchi. Equal excursions.   CV - Normal S1/S2, + systolic murmur LUSB,  Regular rate and rhythm   Abdomin - Soft, mild tenderness RLQ to deep palpation, non-distended, normal bowel sounds  Extremities - no edema, no erythema  Neuro - No focal weakness, normal sensation  Psych - Alert and oriented x3  Skin - no wounds or lesions.          Result Review    Result Review:  I have personally reviewed the results from the time of this admission to 2022 22:32 EST and agree with these findings:  [x]  Laboratory  [x]  Microbiology  [x]  Radiology  [x]  EKG/Telemetry   [x]  Cardiology/Vascular   []  Pathology  [x]  Old records  []  Other:  Most notable findings include:           Assessment & Plan      Brief Patient Summary:  Gray Huerta is a 71 y.o. male who       allopurinol, 300 mg, Oral, Daily  aspirin, 81 mg, Oral, Daily  atorvastatin, 10 mg, Oral, Daily  cefdinir, 300 mg, Oral, Q12H  enoxaparin, 40 mg, Subcutaneous, Daily  levothyroxine, 150 mcg, Oral, Q AM  losartan, 100 mg, Oral, Q24H  metroNIDAZOLE, 500 mg, Oral, Q8H  nebivolol, 10 mg, Oral, Daily  pantoprazole, 40 mg, Oral, Q AM  sodium chloride, 10 mL, Intravenous, Q12H             Active Hospital Problems:  Active Hospital Problems    Diagnosis    • **Chest pain, unspecified type    • Abnormal  nuclear stress test      Plan:     Atypical chest pressure  - previous CT chest with calcified coronaries  - EKG with q waves II, III, avF. No acute ischemic changes  - Murmur on exam  - Check Echo. Consult Cardiology.   - Pending stress test      Abdominal pain/diarrhea  - Colonoscopy recently. Per patient was told has mild Diverticulitis  - Bandemia on labs - resolved  - Will give short course of Cefdinir and Flagyl for Diverticulitis   - Follow blood cultures     Gout - Allopurinol  Hypothyroidism - Levothyroxine  DL - Statin  HTN - Nebivolol, Irbesartan,   Remote history of smoking    If stress test negative will d/c    DVT prophylaxis:  Medical DVT prophylaxis orders are present.    CODE STATUS:             Electronically signed by Muriel Bettencourt MD, 11/08/22, 22:32 EST.  [facility]ist Team

## 2022-11-09 NOTE — PLAN OF CARE
Goal Outcome Evaluation:  Plan of Care Reviewed With: patient           Outcome Evaluation: Pt slept well throughout the night, no c/o chest pain or discomfort at this time. Pt is currently NPO for heart cath @ 1030. Will continue to monitor.

## 2022-11-09 NOTE — PLAN OF CARE
Pt was an admit this afternoon from the ER. Pt is pleasant & cooperative. No complaints. Pt is on room air. Will continue with po antibiotics. Plan is for a heart cath tomorrow. Will continue to monitor.

## 2022-11-09 NOTE — DISCHARGE SUMMARY
[facility] Medicine Services  DISCHARGE SUMMARY    Patient Name: Gray Huerta  : 1950  MRN: [id number]    Date of Admission: 2022  Discharge Diagnosis:     Atypical chest pain  Thoughpatient has risk factors for CAD  MI ruled out with EKG and enzymes  Myoview stress test 2022-consistent with high risk study  Cardiologist was consulted and did cardiac catheterization on 2022 which showed no obstructive CAD  Patient will be discharged home to follow-up with PCP for noncardiac causes of chest pain    Abdominal pain/diarrhea  Had a recent colonoscopy and per patient was told he had mild diverticulitis.  Patient had leukocytosis on presentation and was started on cefdinir and Flagyl for diverticulitis with improvement.  Discharged on Augmentin to complete antibiotics course    Gout-on allopurinol    Hypothyroidism-on Synthroid    Dyslipidemia-on statin      Hypertension-on nebivolol and  Irbesartan          Date of Discharge: 2022  Primary Care Physician: Venecia Kohler MD      Presenting Problem:   Abnormal nuclear stress test [R94.39]  Leukocytosis, unspecified type [D72.829]  Chest pain, unspecified type [R07.9]  Sepsis, due to unspecified organism, unspecified whether acute organ dysfunction present (HCC) [A41.9]    Active and Resolved Hospital Problems:  Active Hospital Problems    Diagnosis POA   • **Chest pain, unspecified type [R07.9] Yes   • Abnormal nuclear stress test [R94.39] Unknown      Resolved Hospital Problems   No resolved problems to display.         Hospital Course     Hospital Course:  Patient is a 71-year-old male with medical history significant for hypothyroidism, hypertension, dyslipidemia and obstructive sleep sleep apnea on BiPAP at home.  Presented to [facility] ED on 2022 with complaints of substernal chest pain/epigastric pain.  Patient was admitted to rule out acute coronary syndrome.    Conditions  addressed while inpatient are as stated below      Atypical chest pain  Thoughpatient has risk factors for CAD  MI ruled out with EKG and enzymes  Myoview stress test 11/8/2022-consistent with high risk study  Cardiologist was consulted and did cardiac catheterization on 11/9/2022 which showed no obstructive CAD  Patient will be discharged home to follow-up with PCP for noncardiac causes of chest pain    Abdominal pain/diarrhea  Had a recent colonoscopy and per patient was told he had mild diverticulitis.  Patient had leukocytosis on presentation and was started on cefdinir and Flagyl for diverticulitis with improvement.  Discharged on Augmentin to complete antibiotics course    Gout-on allopurinol    Hypothyroidism-on Synthroid    Dyslipidemia-on statin      Hypertension-on nebivolol and  Irbesartan       Condition at discharge-stable    DISCHARGE Follow Up Recommendations for labs and diagnostics:       Reasons For Change In Medications and Indications for New Medications:      Day of Discharge     Vital Signs:  Temp:  [97.5 °F (36.4 °C)-98.2 °F (36.8 °C)] 97.5 °F (36.4 °C)  Heart Rate:  [57-70] 61  Resp:  [15-20] 20  BP: (123-167)/(61-86) 137/80    Physical Exam:  Physical Exam  Vitals reviewed.   Constitutional:       General: He is not in acute distress.  HENT:      Head: Normocephalic.      Nose: Nose normal.      Mouth/Throat:      Mouth: Mucous membranes are moist.   Eyes:      Extraocular Movements: Extraocular movements intact.      Conjunctiva/sclera: Conjunctivae normal.      Pupils: Pupils are equal, round, and reactive to light.   Cardiovascular:      Rate and Rhythm: Normal rate and regular rhythm.   Pulmonary:      Effort: No respiratory distress.      Breath sounds: Normal breath sounds.   Abdominal:      General: Bowel sounds are normal. There is no distension.      Palpations: Abdomen is soft.      Tenderness: There is no abdominal tenderness.   Musculoskeletal:         General: Normal range of  motion.      Cervical back: Neck supple.   Skin:     General: Skin is warm and dry.   Neurological:      Mental Status: He is alert and oriented to person, place, and time.   Psychiatric:         Mood and Affect: Mood normal.            Pertinent  and/or Most Recent Results     LAB RESULTS:      Lab 11/08/22 2301 11/08/22  0535 11/07/22  1520 11/07/22  1110   WBC 8.30 9.70  --  17.70*   HEMOGLOBIN 12.7* 12.5*  --  14.8   HEMATOCRIT 39.5 38.4  --  45.2   PLATELETS 237 220  --  265   NEUTROS ABS 5.50 7.50*  --  14.87*   LYMPHS ABS 1.60 0.90  --   --    MONOS ABS 1.10* 1.20*  --   --    EOS ABS 0.10 0.00  --   --    MCV 93.1 92.0  --  93.4   LACTATE  --   --  1.6  --    PROTIME 10.2  --   --  9.7   APTT 29.6  --   --  25.4*         Lab 11/08/22 2301 11/08/22  0535 11/07/22  1110   SODIUM 138 135* 141   POTASSIUM 3.7 4.0 4.5   CHLORIDE 99 97* 103   CO2 26.0 28.0 27.0   ANION GAP 13.0 10.0 11.0   BUN 16 20 18   CREATININE 0.95 1.06 0.97   EGFR 86.6 76.0 84.5   GLUCOSE 118* 137* 125*   CALCIUM 9.1 8.9 9.8         Lab 11/07/22  1110   TOTAL PROTEIN 7.9   ALBUMIN 4.80   GLOBULIN 3.1   ALT (SGPT) 45*   AST (SGOT) 25   BILIRUBIN 0.4   ALK PHOS 90   LIPASE 38         Lab 11/08/22 2301 11/08/22  0910 11/07/22  1110   TROPONIN T  --  <0.010 <0.010   PROTIME 10.2  --  9.7   INR 0.99  --  0.94                 Brief Urine Lab Results  (Last result in the past 365 days)      Color   Clarity   Blood   Leuk Est   Nitrite   Protein   CREAT   Urine HCG        11/07/22 1527 Yellow   Clear   Negative   Negative   Negative   Negative               Microbiology Results (last 10 days)     Procedure Component Value - Date/Time    Respiratory Panel PCR w/COVID-19(SARS-CoV-2) CLIFFORD/CLEO/JEREMIAS/PAD/COR/MAD/MARCIAL In-House, NP Swab in UTM/VTM, 3-4 HR TAT - Swab, Nasopharynx [[id number]]  (Normal) Collected: 11/07/22 1527    Lab Status: Final result Specimen: Swab from Nasopharynx Updated: 11/07/22 1621     ADENOVIRUS, PCR Not Detected     Coronavirus  229E Not Detected     Coronavirus HKU1 Not Detected     Coronavirus NL63 Not Detected     Coronavirus OC43 Not Detected     COVID19 Not Detected     Human Metapneumovirus Not Detected     Human Rhinovirus/Enterovirus Not Detected     Influenza A PCR Not Detected     Influenza B PCR Not Detected     Parainfluenza Virus 1 Not Detected     Parainfluenza Virus 2 Not Detected     Parainfluenza Virus 3 Not Detected     Parainfluenza Virus 4 Not Detected     RSV, PCR Not Detected     Bordetella pertussis pcr Not Detected     Bordetella parapertussis PCR Not Detected     Chlamydophila pneumoniae PCR Not Detected     Mycoplasma pneumo by PCR Not Detected    Narrative:      In the setting of a positive respiratory panel with a viral infection PLUS a negative procalcitonin without other underlying concern for bacterial infection, consider observing off antibiotics or discontinuation of antibiotics and continue supportive care. If the respiratory panel is positive for atypical bacterial infection (Bordetella pertussis, Chlamydophila pneumoniae, or Mycoplasma pneumoniae), consider antibiotic de-escalation to target atypical bacterial infection.    Blood Culture - Blood, Arm, Left [[id number]]  (Normal) Collected: 11/07/22 1515    Lab Status: Preliminary result Specimen: Blood from Arm, Left Updated: 11/08/22 1532     Blood Culture No growth at 24 hours    Blood Culture - Blood, Arm, Right [[id number]]  (Normal) Collected: 11/07/22 1515    Lab Status: Preliminary result Specimen: Blood from Arm, Right Updated: 11/08/22 1532     Blood Culture No growth at 24 hours          XR Chest 1 View    Result Date: 11/7/2022  Impression: Questionable 1.5 cm noncalcified nodular density in the periphery of the right midlung, new since 2015. Dedicated CT chest is recommended for further evaluation at this time.  Electronically Signed By-Nory Galvan MD On:11/7/2022 11:27 AM This report was finalized on [id number] by  Nory Galvan  MD.    CT Angiogram Chest Pulmonary Embolism    Result Date: 11/7/2022  Impression:  1. No pulmonary embolism. 2. The questioned right lung nodule on previous chest x-ray corresponds to a benign centrally calcified nodule, consistent with old granulomatous disease. 3. There are additional smaller noncalcified nodules in both lungs measuring 6 mm or less in size. Following the Fleischner Society criteria for pulmonary nodule management, CT chest follow-up in 3-6 months is recommended. 4. Hepatic steatosis.    Electronically Signed By-Nory Galvan MD On:11/7/2022 2:54 PM This report was finalized on [id number] by  Nory Galvan MD.              Results for orders placed during the hospital encounter of 11/07/22    Adult Transthoracic Echo Complete W/ Cont if Necessary Per Protocol    Interpretation Summary  •  Left ventricular ejection fraction appears to be 61 - 65%.  •  The right ventricular cavity is mildly dilated.  •  No pericardial effusion noted      Labs Pending at Discharge:  Pending Labs     Order Current Status    Blood Culture - Blood, Arm, Left Preliminary result    Blood Culture - Blood, Arm, Right Preliminary result          Procedures Performed  Procedure(s):  Left Heart Cath and coronary angiogram         Consults:   Consults     Date and Time Order Name Status Description    11/7/2022  7:47 PM IP Consult to Cardiology Completed     11/7/2022  3:28 PM Hospitalist (on-call MD unless specified)              Discharge Details        Discharge Medications      New Medications      Instructions Start Date   amoxicillin-clavulanate 875-125 MG per tablet  Commonly known as: Augmentin   1 tablet, Oral, 2 Times Daily      pantoprazole 40 MG EC tablet  Commonly known as: PROTONIX   40 mg, Oral, Every Early Morning   Start Date: November 10, 2022        Continue These Medications      Instructions Start Date   allopurinol 300 MG tablet  Commonly known as: ZYLOPRIM   1 tablet, Oral, Daily      irbesartan  300 MG tablet  Commonly known as: AVAPRO   1 tablet, Oral, Daily      levothyroxine 150 MCG tablet  Commonly known as: SYNTHROID, LEVOTHROID   1 tablet, Oral, Every Early Morning      nebivolol 10 MG tablet  Commonly known as: BYSTOLIC   1 tablet, Oral, Daily      simvastatin 20 MG tablet  Commonly known as: ZOCOR   20 mg, Oral, Nightly         Stop These Medications    ascorbic acid 1000 MG tablet  Commonly known as: VITAMIN C     Cinnamon 500 MG capsule     colchicine 0.6 MG capsule capsule     fish oil 1200 MG capsule capsule     furosemide 20 MG tablet  Commonly known as: LASIX     Magnesium 400 MG tablet     PROBIOTIC PO     QUERCETIN PO     Turmeric Curcumin 500 MG capsule     vitamin b complex capsule capsule     vitamin B-12 500 MCG tablet  Commonly known as: CYANOCOBALAMIN     vitamin D3 125 MCG (5000 UT) capsule capsule     Zinc 50 MG capsule            No Known Allergies      Discharge Disposition:   Home or Self Care    Diet:  Hospital:  Diet Order   Procedures   • Diet Cardiac; 2gm Na+         Discharge Activity:   Activity Instructions     Gradually Increase Activity Until at Pre-Hospitalization Level              CODE STATUS:  There are no questions and answers to display.         No future appointments.    Additional Instructions for the Follow-ups that You Need to Schedule     Discharge Follow-up with PCP   As directed       Currently Documented PCP:    Venecia Kohler MD    PCP Phone Number:    [phone]     Follow Up Details: Follow-up with PCP in 2 to 4 weeks               Time spent on Discharge including face to face service:  32 minutes    This patient has been examined with appropriate PPE . 11/09/22      Signature: Electronically signed by Seth Diaz MD, 11/09/22, 3:10 PM EST.

## 2022-11-09 NOTE — PROGRESS NOTES
Referring Provider: Hospitalist    Reason for follow-up: Chest pain and abnormal Myoview     Patient Care Team:  Venecia Kohler MD as PCP - General (Family Medicine)  Provider, No Known as PCP - Family Medicine    Subjective .  Patient doing well without any symptoms today    Objective  Lying in bed comfortably     Review of Systems   Constitutional: Negative for malaise/fatigue.   Cardiovascular: Negative for chest pain, dyspnea on exertion, leg swelling and palpitations.   Respiratory: Negative for cough and shortness of breath.    Gastrointestinal: Negative for abdominal pain, nausea and vomiting.   Neurological: Negative for dizziness, focal weakness, headaches, light-headedness and numbness.   All other systems reviewed and are negative.      Patient has no known allergies.    Scheduled Meds:allopurinol, 300 mg, Oral, Daily  aspirin, 81 mg, Oral, Daily  atorvastatin, 10 mg, Oral, Daily  cefdinir, 300 mg, Oral, Q12H  enoxaparin, 40 mg, Subcutaneous, Daily  fentaNYL citrate (PF), 25 mcg, Intravenous, Once  levothyroxine, 150 mcg, Oral, Q AM  losartan, 100 mg, Oral, Q24H  metroNIDAZOLE, 500 mg, Oral, Q8H  midazolam, 1 mg, Intravenous, Once  nebivolol, 10 mg, Oral, Daily  pantoprazole, 40 mg, Oral, Q AM  sodium chloride, 10 mL, Intravenous, Q12H      Continuous Infusions:sodium chloride, 75 mL/hr, Last Rate: 75 mL/hr (11/09/22 1515)      PRN Meds:.•  acetaminophen  •  atropine  •  senna-docusate sodium **AND** polyethylene glycol **AND** bisacodyl **AND** bisacodyl  •  HYDROcodone-acetaminophen  •  influenza vaccine  •  melatonin  •  ondansetron **OR** ondansetron  •  [COMPLETED] Insert peripheral IV **AND** sodium chloride  •  sodium chloride  •  sodium chloride        VITAL SIGNS  Vitals:    11/09/22 1145 11/09/22 1230 11/09/22 1300 11/09/22 1330   BP: 146/71 167/79 146/70 137/80   BP Location:  Right arm     Patient Position:  Lying     Pulse: 62 63 64 61   Resp:       Temp:       TempSrc:       SpO2: 96%  "     Weight:       Height:           Flowsheet Rows    Flowsheet Row First Filed Value   Admission Height 188 cm (74\") Documented at 11/07/2022 1013   Admission Weight 111 kg (245 lb) Documented at 11/07/2022 1013           TELEMETRY: Normal sinus rhythm    Physical Exam:  Constitutional:       Appearance: Well-developed.   Eyes:      General: No scleral icterus.     Conjunctiva/sclera: Conjunctivae normal.   HENT:      Head: Normocephalic and atraumatic.   Neck:      Vascular: No carotid bruit or JVD.   Pulmonary:      Effort: Pulmonary effort is normal.      Breath sounds: Normal breath sounds. No wheezing. No rales.   Cardiovascular:      Normal rate. Regular rhythm.   Pulses:     Intact distal pulses.   Abdominal:      General: Bowel sounds are normal.      Palpations: Abdomen is soft.   Musculoskeletal:      Cervical back: Normal range of motion and neck supple. Skin:     General: Skin is warm and dry.      Findings: No rash.   Neurological:      Mental Status: Alert.          Results Review:   I reviewed the patient's new clinical results.  Lab Results (last 24 hours)     Procedure Component Value Units Date/Time    Blood Culture - Blood, Arm, Left [[id number]]  (Normal) Collected: 11/07/22 1515    Specimen: Blood from Arm, Left Updated: 11/09/22 1531     Blood Culture No growth at 2 days    Blood Culture - Blood, Arm, Right [[id number]]  (Normal) Collected: 11/07/22 1515    Specimen: Blood from Arm, Right Updated: 11/09/22 1531     Blood Culture No growth at 2 days    Basic Metabolic Panel [[id number]]  (Abnormal) Collected: 11/08/22 2301    Specimen: Blood Updated: 11/09/22 0050     Glucose 118 mg/dL      BUN 16 mg/dL      Creatinine 0.95 mg/dL      Sodium 138 mmol/L      Potassium 3.7 mmol/L      Comment: Slight hemolysis detected by analyzer. Results may be affected.        Chloride 99 mmol/L      CO2 26.0 mmol/L      Calcium 9.1 mg/dL      BUN/Creatinine Ratio 16.8     Anion Gap 13.0 mmol/L      eGFR 86.6 " mL/min/1.73      Comment: National Kidney Foundation and American Society of Nephrology (ASN) Task Force recommended calculation based on the Chronic Kidney Disease Epidemiology Collaboration (CKD-EPI) equation refit without adjustment for race.       Narrative:      GFR Normal >60  Chronic Kidney Disease <60  Kidney Failure <15      Protime-INR [[id number]]  (Normal) Collected: 11/08/22 2301    Specimen: Blood Updated: 11/09/22 0047     Protime 10.2 Seconds      INR 0.99    aPTT [[id number]]  (Normal) Collected: 11/08/22 2301    Specimen: Blood Updated: 11/09/22 0047     PTT 29.6 seconds     CBC & Differential [[id number]]  (Abnormal) Collected: 11/08/22 2301    Specimen: Blood Updated: 11/09/22 0037    Narrative:      The following orders were created for panel order CBC & Differential.  Procedure                               Abnormality         Status                     ---------                               -----------         ------                     CBC Auto Differential[[id number]]        Abnormal            Final result                 Please view results for these tests on the individual orders.    CBC Auto Differential [[id number]]  (Abnormal) Collected: 11/08/22 2301    Specimen: Blood Updated: 11/09/22 0037     WBC 8.30 10*3/mm3      RBC 4.24 10*6/mm3      Hemoglobin 12.7 g/dL      Hematocrit 39.5 %      MCV 93.1 fL      MCH 30.0 pg      MCHC 32.2 g/dL      RDW 14.9 %      RDW-SD 48.1 fl      MPV 8.0 fL      Platelets 237 10*3/mm3      Neutrophil % 66.5 %      Lymphocyte % 18.7 %      Monocyte % 13.4 %      Eosinophil % 1.0 %      Basophil % 0.4 %      Neutrophils, Absolute 5.50 10*3/mm3      Lymphocytes, Absolute 1.60 10*3/mm3      Monocytes, Absolute 1.10 10*3/mm3      Eosinophils, Absolute 0.10 10*3/mm3      Basophils, Absolute 0.00 10*3/mm3      nRBC 0.0 /100 WBC           Imaging Results (Last 24 Hours)     ** No results found for the last 24 hours. **          EKG      I personally viewed and  interpreted the patient's EKG/Telemetry data:    ECHOCARDIOGRAM:    STRESS MYOVIEW:    CARDIAC CATHETERIZATION:    OTHER:         Assessment & Plan     Principal Problem:    Chest pain, unspecified type  Active Problems:    Abnormal nuclear stress test  Hypertension  Hyperlipidemia    Presented with chest pain is ruled out for MI by get enzymes  Patient had a stress Myoview study which is abnormal  Patient underwent a cardiac catheterization which showed normal coronaries  Continue medical therapy and followed by the primary care doctor.    I discussed the patients findings and my recommendations with patient and nurse    Td Batista MD  11/09/22  16:27 EST

## 2022-11-09 NOTE — PLAN OF CARE
Problem: Adult Inpatient Plan of Care  Goal: Patient-Specific Goal (Individualized)  Outcome: Adequate for Care Transition   Goal Outcome Evaluation:

## 2022-11-10 LAB — QT INTERVAL: 399 MS

## 2022-11-12 LAB
BACTERIA SPEC AEROBE CULT: NORMAL
BACTERIA SPEC AEROBE CULT: NORMAL

## 2025-05-20 ENCOUNTER — TELEPHONE (OUTPATIENT)
Dept: CARDIOLOGY | Facility: CLINIC | Age: 75
End: 2025-05-20
Payer: MEDICARE

## 2025-05-20 NOTE — TELEPHONE ENCOUNTER
"Called patient to advise that we do not show any history of CAD in his medical record and per his heart cath from 2022 it specifically says \"No CAD\".     Patient reports that he has contacted that company and has requested their report to see where they got this information from and he plans to dispute it and he will call us back if he needs anything further.   "

## 2025-05-20 NOTE — TELEPHONE ENCOUNTER
Heart cath 11/9/22 by Dr. Batista.     Patient was told after cath whatever was going on was not related to his heart. He was advised to follow up with PCP for noncardiac cause of chest pain.   Patient is trying to change insurance supplement. After reviewing medical records, insurance has denied. It is on medical records he has CAD. Patient asking if  can provide a letter for him to give to insurance that he does not have CAD.

## 2025-05-22 NOTE — TELEPHONE ENCOUNTER
Gray called in with update. He spoke to Noel and they have records from [facility] AT [address] that mentions angina pectoris. Noel told him that means he has diabetes with CAD.  If this is error they want us to send email stating it is being deleted from patient's records. I was not sure if we could send an email to them. Email: [email or web address]  Policy-[id number]

## 2025-05-22 NOTE — TELEPHONE ENCOUNTER
"I attempted to send an email to the address provided, but it came back with an automated message stating that if you have questions or concerns to reach out to the original insurance company that you applied through. I called the patient back to advise of this information and to also advise that angina pectoris has absolutely nothing to do with diabetes, so I am not sure where they got that information from. Also, there is nothing in our records even referring to diabetes as we have never seen him for anything outside of that one time for chest pain. I advised the patient to contact his original /company that he applied through to see about getting this resolved as there is not anything on our end that we can change or \"correct\" because everything in our system says no CAD. Patient voiced his understanding.   " What Is The Reason For Today's Visit?: Full Body Skin Examination What Is The Reason For Today's Visit? (Being Monitored For X): the development of new lesions How Severe Are Your Spot(S)?: mild

## (undated) DEVICE — GW PTFE EMERALD HEPCOAT FC J TIP STD .035 3MM 150CM

## (undated) DEVICE — PINNACLE INTRODUCER SHEATH: Brand: PINNACLE

## (undated) DEVICE — RADIFOCUS OBTURATOR: Brand: RADIFOCUS

## (undated) DEVICE — CATH DIAG IMPULSE PIG .056 6F 110CM

## (undated) DEVICE — CATH DIAG IMPULSE FL4 6F 100CM

## (undated) DEVICE — CATH DIAG IMPULSE FR4 6F 100CM

## (undated) DEVICE — PK TRY HEART CATH 50

## (undated) DEVICE — ANGIO-SEAL VIP VASCULAR CLOSURE DEVICE: Brand: ANGIO-SEAL